# Patient Record
Sex: MALE | Race: WHITE | NOT HISPANIC OR LATINO | Employment: OTHER | URBAN - METROPOLITAN AREA
[De-identification: names, ages, dates, MRNs, and addresses within clinical notes are randomized per-mention and may not be internally consistent; named-entity substitution may affect disease eponyms.]

---

## 2017-01-30 ENCOUNTER — APPOINTMENT (OUTPATIENT)
Dept: SLEEP MEDICINE | Facility: MEDICAL CENTER | Age: 82
End: 2017-01-30
Payer: MEDICARE

## 2017-02-07 ENCOUNTER — HOSPITAL ENCOUNTER (OUTPATIENT)
Dept: RADIOLOGY | Facility: MEDICAL CENTER | Age: 82
End: 2017-02-07
Attending: PHYSICIAN ASSISTANT
Payer: MEDICARE

## 2017-02-07 DIAGNOSIS — Z01.818 PREOP EXAMINATION: ICD-10-CM

## 2017-02-07 PROCEDURE — 71020 DX-CHEST-2 VIEWS: CPT

## 2017-03-02 ENCOUNTER — TELEPHONE (OUTPATIENT)
Dept: PULMONOLOGY | Facility: HOSPICE | Age: 82
End: 2017-03-02

## 2017-03-02 DIAGNOSIS — G47.33 OSA (OBSTRUCTIVE SLEEP APNEA): ICD-10-CM

## 2017-03-02 NOTE — TELEPHONE ENCOUNTER
Patient is calling for clearance for TERESA only, CNOX was done through [facility], this is scanned into the patients chart.   I requested sleep compliance.

## 2017-03-02 NOTE — TELEPHONE ENCOUNTER
1. Caller Name: Gumaro Garsia                 Call Back Number: [phone] (home)       Patient approves a detailed voicemail message: yes    2.  What is the procedure: Hip Suregery    3.  When is the procedure scheduled: TBD    4.  Who is the Surgeon: Dr. Froylan Gunter    5. Has the patient completed any screening tests for the procedure:     6. Has [facility] received a written request from Surgeon: no    7. Patient scheduled for an appointment for this clearance?:  no    8. Last OV: 11/29/2016 - Tere[facility] Rehman    9. Next OV: 03/15/2017 - Tereza Rehman     10. Last CNOX: 01/01/2017 - [facility] Scanned into [facility]    11. Last Compliance: 06/27/16 - I Can request download from [facility]    Current Medications  Current Outpatient Prescriptions   Medication Sig Dispense Refill   • gabapentin (NEURONTIN) 300 MG Cap Take 300 mg by mouth 3 times a day.     • ipratropium (ATROVENT) 0.02 % Solution 0.2 mg by Nebulization route 4 times a day.     • famotidine (PEPCID) 40 MG Tab Take 40 mg by mouth every day.     • fluticasone (FLONASE) 50 MCG/ACT nasal spray Spray 1-2 Sprays in nose every day.     • tamsulosin (FLOMAX) 0.4 MG capsule Take 0.4 mg by mouth ONE-HALF HOUR AFTER BREAKFAST.     • Cholecalciferol (VITAMIN D3) 5000 UNITS Tab Take  by mouth every day.     • Artificial Tear Solution (BION TEARS OP) Take  by mouth every day.     • levothyroxine (SYNTHROID) 75 MCG TABS Take 100 mcg by mouth every day.     • Calcium Carbonate (CALCIUM 500 PO) Take  by mouth every day. Takes two tablets     • TESTOSTERONE CYPIONATE IM 0.05 mL by Intramuscular route every 7 days.     • atorvastatin (LIPITOR) 40 MG TABS Take 40 mg by mouth every evening.     • Ginkgo Biloba (GNP GINGKO BILOBA EXTRACT PO) Take  by mouth every day.     • aspirin 81 MG tablet Take 81 mg by mouth. Three times per week     • trospium (SANCTURA) 20 MG TABS Take 20 mg by mouth 2 Times a Day.     • Multiple Vitamin (MULTI-VITAMIN PO) Take 1 Tab by  mouth every evening.     • VITAMIN D PO Take 5,000 Int'l Units by mouth every evening.       No current facility-administered medications for this visit.       Please advise.

## 2017-03-02 NOTE — TELEPHONE ENCOUNTER
If he is requesting clearance for sleep apnea only, then please request download and CNOX from [facility] and I can write a clearance. However, if they want Pulmonary clearance then he will need testing.

## 2017-03-08 NOTE — TELEPHONE ENCOUNTER
Spoke to Radha at [facility] and the last download is from November. I will advise patient he needs to drop off the chip to machine.  Please sign order for complilance download.

## 2017-03-15 ENCOUNTER — SLEEP CENTER VISIT (OUTPATIENT)
Dept: SLEEP MEDICINE | Facility: MEDICAL CENTER | Age: 82
End: 2017-03-15
Payer: MEDICARE

## 2017-03-15 VITALS
TEMPERATURE: 98.4 F | WEIGHT: 195 LBS | RESPIRATION RATE: 16 BRPM | SYSTOLIC BLOOD PRESSURE: 116 MMHG | OXYGEN SATURATION: 91 % | HEART RATE: 63 BPM | HEIGHT: 70 IN | DIASTOLIC BLOOD PRESSURE: 72 MMHG | BODY MASS INDEX: 27.92 KG/M2

## 2017-03-15 DIAGNOSIS — R09.02 HYPOXEMIA: ICD-10-CM

## 2017-03-15 DIAGNOSIS — G47.33 OBSTRUCTIVE SLEEP APNEA: ICD-10-CM

## 2017-03-15 PROCEDURE — 99213 OFFICE O/P EST LOW 20 MIN: CPT | Performed by: NURSE PRACTITIONER

## 2017-03-15 NOTE — PROGRESS NOTES
Chief Complaint   Patient presents with   • Apnea     CPAP 16/11       HPI:  Gumaro Garsia is a 86 y.o. year old male here today for follow-up on his obstructive sleep apnea. His initial Polysomnogram indicated an AHI of 51 with severe desaturations. He had been on Auto CPAP 10-20 CM H20. CNOX indicated persistent 02 desaturations and 02 bleed in at 2 LPM was recommended. He underwent a dedicated in lab titration study on 4/26/2016 which indicates incomplete titration to CPAP. He was switched to Bipap. He did best on a Bilevel pressure of 18/13 CM with a resultant AHI of 3.5. He did require 02 bleed in at 2 LPM. His compliance card download indicated an AHI of 7.6 with an average use of 6-7 hours at night. Overnight oximetry 11/12/2016 on Bipap with 2 LPM 02 bleed in, indicated a mean 02 saturation of 89.4% with 168 minutes spent with saturations less than 88%. He felt his Bipap pressures were too high. He was waking bloated and was experiencing increased belching in the morning. His pressure was decreased to 16/11 CM and his 02 bleed in was increased to 3 LPM. Repeat oximetry 12/31/2016 indicates a mean 02 saturation of 89.4% with 153 minutes with saturations less than 88%. His repeat compliance card download indicates an AHI of 5.8 with an average use of 7 hours at night. He is tolerating the lower pressure better. He feels he sleeps well at night. He denies significant daytime fatigue. He denies any morning headaches. He has nasal pillows. He does not feel he is opening his mouth during sleep.   He is pending upcoming hip surgery.   He does have a history of Atrial fibrillation and is followed by Cardiology.      Past Medical History   Diagnosis Date   • Arthritis    • Sleep apnea      CPAP   • Unspecified disorder of thyroid      LOW THYROID   • Arrhythmia      ATRIAL FLUTTER   • Atrial flutter (CMS-HCC) 1/4/2012     S/p ablation    • Lipids abnormal 1/4/2012   • Rotator cuff arthropathy    • Cancer  (CMS-[facility]) 2007     PROSTATE  WITH RADIATION   • Urinary incontinence    • High cholesterol    • Cataract    • Pain      right shoulder   • Pain 11/11/15     left knee right hip   • Obstructive sleep apnea        Past Surgical History   Procedure Laterality Date   • Recovery  12/9/2010     Performed by SURGERY, CATH-RECOVERY at SURGERY SAME DAY [facility] ORS   • Appendectomy  1950   • Inguinal hernia repair  1965     LEFT   • Other  1946      tonsillectomy   • Lumbar decompression  1996     L2-4 FUSION   • Recovery  1/6/2011     Performed by SURGERY, CATH-RECOVERY at SURGERY SAME DAY [facility] ORS   • Other cardiac surgery       ablation   • Lumbar laminectomy diskectomy  4/14/2011     Performed by LUIS GREGORIO at SURGERY [facility]   • Lumbar decompression  4/14/2011     Performed by LUIS GREGORIO at SURGERY [facility]   • Tonsillectomy  1947   • Shoulder arthroscopy w/ rotator cuff repair  5/29/2013     Performed by Clovis Cabral M.D. at SURGERY [facility]   • Shoulder decompression arthroscopic  5/29/2013     Performed by Clovis Cabral M.D. at [facility]   • Knee arthroplasty total Left 11/24/2015     Procedure: KNEE ARTHROPLASTY TOTAL;  Surgeon: Clovis Cabral M.D.;  Location: SURGERY [facility];  Service:        Family History   Problem Relation Age of Onset   • Other Mother      Multiple myeloma   • Heart Attack Father 79   • Diabetes     • Cancer     • Heart Disease         Social History     Social History   • Marital Status:      Spouse Name: N/A   • Number of Children: N/A   • Years of Education: N/A     Occupational History   • Not on file.     Social History Main Topics   • Smoking status: Never Smoker    • Smokeless tobacco: Not on file   • Alcohol Use: No   • Drug Use: No   • Sexual Activity: Not on file     Other Topics Concern   • Not on file     Social History Narrative       ROS:  Constitutional: Denies fevers, chills, sweats, fatigue, weight loss  Eyes:  Denies vision loss, pain, drainage, double vision  Ears/Nose/Mouth/Throat: Denies rhinitis, nasal congestion, ear ache, sore throat, persistent hoarseness, decayed teeth/toothache. [place]   Cardiovascular: Denies chest pain, tightness, palpitations, swelling in feet/legs, fainting, difficulty breathing when laying down  Respiratory: Denies shortness of breath, cough, sputum, wheezing, painful breathing, coughing up blood  GI: Denies heartburn, difficulty swallowing, nausea, vomiting, abdominal pain, diarrhea, constipation  : Denies frequent urination, painful urination  Integumentary: Denies rashes, lumps or color changes  MSK: Positive hip pain   Neurological: Denies frequent headaches, dizziness, weakness  Sleep: See HPI       Current Outpatient Prescriptions on File Prior to Visit   Medication Sig Dispense Refill   • gabapentin (NEURONTIN) 300 MG Cap Take 300 mg by mouth 3 times a day.     • ipratropium (ATROVENT) 0.02 % Solution 0.2 mg by Nebulization route 4 times a day.     • famotidine (PEPCID) 40 MG Tab Take 40 mg by mouth every day.     • fluticasone (FLONASE) 50 MCG/ACT nasal spray Spray 1-2 Sprays in nose every day.     • tamsulosin (FLOMAX) 0.4 MG capsule Take 0.4 mg by mouth ONE-HALF HOUR AFTER BREAKFAST.     • Cholecalciferol (VITAMIN D3) 5000 UNITS Tab Take  by mouth every day.     • Artificial Tear Solution (BION TEARS OP) Take  by mouth every day.     • levothyroxine (SYNTHROID) 75 MCG TABS Take 100 mcg by mouth every day.     • Calcium Carbonate (CALCIUM 500 PO) Take  by mouth every day. Takes two tablets     • TESTOSTERONE CYPIONATE IM 0.05 mL by Intramuscular route every 7 days.     • atorvastatin (LIPITOR) 40 MG TABS Take 40 mg by mouth every evening.     • Ginkgo Biloba (GNP GINGKO BILOBA EXTRACT PO) Take  by mouth every day.     • aspirin 81 MG tablet Take 81 mg by mouth. Three times per week     • trospium (SANCTURA) 20 MG TABS Take 20 mg by mouth 2 Times a Day.     • Multiple Vitamin  "(MULTI-VITAMIN PO) Take 1 Tab by mouth every evening.     • VITAMIN D PO Take 5,000 Int'l Units by mouth every evening.       No current facility-administered medications on file prior to visit.     Review of patient's allergies indicates no known allergies.    Blood pressure 116/72, pulse 63, temperature 36.9 °C (98.4 °F), resp. rate 16, height 1.778 m (5' 10\"), weight 88.451 kg (195 lb), SpO2 91 %.  PE:   Appearance: Well developed, well nourished, no acute distress  Eyes: PERRL, EOM intact, sclera white, conjunctiva moist  Ears: Lumbee, bilateral hearing aids   Hearing: grossly intact  Nose: no lesions or deformities  Oropharynx: tongue normal, posterior pharynx without erythema or exudate  Mallampati Classification: class 4  Neck: supple, trachea midline, no masses   Respiratory effort: no intercostal retractions or use of accessory muscles  Lung auscultation: no rales, rhonchi or wheezes  Heart auscultation: no murmur rub or gallop  Extremities: no cyanosis or edema  Abdomen: soft ,non tender, no masses  Gait and Station: normal  Digits and nails: no clubbing, cyanosis, petechiae or nodes.  Cranial nerves: grossly intact  Skin: no rashes, lesions or ulcers noted  Orientation: Oriented to time, person and place  Mood and affect: mood and affect appropriate, normal interaction with examiner  Judgement: Intact          Assessment:  1. Obstructive sleep apnea     2. Hypoxemia           Plan:    1) Continue Bipap 16/11 CM H20. Increase 02 bleed in to 4 LPM.   2) Sleep hygiene discussed.  3) He is pending upcoming hip surgery.  4) 6 month follow up, sooner if needed.   "

## 2017-03-15 NOTE — MR AVS SNAPSHOT
"Gumaro Garsia   3/15/2017 1:00 PM   Sleep Center Visit   MRN: [id number]    Department:  [facility]   Dept Phone:  [phone]    Description:  Male : 1931   Provider:  DEMETRICE Amaral           Reason for Visit     Apnea CPAP       Allergies as of 3/15/2017     No Known Allergies      You were diagnosed with     Obstructive sleep apnea   [782052]       Hypoxemia   [799.02.ICD-9-CM]         Vital Signs     Blood Pressure Pulse Temperature Respirations Height Weight    116/72 mmHg 63 36.9 °C (98.4 °F) 16 1.778 m (5' 10\") 88.451 kg (195 lb)    Body Mass Index Oxygen Saturation Smoking Status             27.98 kg/m2 91% Never Smoker          Basic Information     Date Of Birth Sex Race Ethnicity Preferred Language    1931 Male White Non- English      Your appointments     Oct 13, 2017 10:00 AM   Follow UP with DEMETRICE Amaral   [facility] (--)    [address] [place] [id number]   [phone]              Problem List              ICD-10-CM Priority Class Noted - Resolved    Lipids abnormal E78.89   2012 - Present    Aortic valve sclerosis I35.8   4/15/2013 - Present    Hypothyroid E03.9   4/15/2013 - Present    Rotator cuff arthropathy M12.819   Unknown - Present    Rotator cuff tear    2013 - Present    Osteoarthritis of left knee M17.9   2015 - Present    Obstructive sleep apnea G47.33   Unknown - Present    Hypoxemia R09.02   2016 - Present      Health Maintenance        Date Due Completion Dates    IMM DTaP/Tdap/Td Vaccine (1 - Tdap) 1950 ---    COLONOSCOPY 1981 ---    IMM ZOSTER VACCINE 1991 ---    IMM PNEUMOCOCCAL 65+ (ADULT) LOW/MEDIUM RISK SERIES (1 of 2 - PCV13) 1996 ---    IMM INFLUENZA (1) 2016 ---            Current Immunizations     Pneumococcal Vaccine (UF)Historical Data 10/1/2010      Below and/or attached are the medications your provider expects you " to take. Review all of your home medications and newly ordered medications with your provider and/or pharmacist. Follow medication instructions as directed by your provider and/or pharmacist. Please keep your medication list with you and share with your provider. Update the information when medications are discontinued, doses are changed, or new medications (including over-the-counter products) are added; and carry medication information at all times in the event of emergency situations     Allergies:  No Known Allergies          Medications  Valid as of: March 15, 2017 -  1:30 PM    Generic Name Brand Name Tablet Size Instructions for use    Artificial Tear Solution   Take  by mouth every day.        Aspirin (Tab) aspirin 81 MG Take 81 mg by mouth. Three times per week        Atorvastatin Calcium (Tab) LIPITOR 40 MG Take 40 mg by mouth every evening.        Calcium-Magnesium-Vitamin D   Take  by mouth every day. Takes two tablets        Cholecalciferol   Take 5,000 Int'l Units by mouth every evening.        Cholecalciferol (Tab) Vitamin D3 5000 UNITS Take  by mouth every day.        Famotidine (Tab) PEPCID 40 MG Take 40 mg by mouth every day.        Fluticasone Propionate (Suspension) FLONASE 50 MCG/ACT Spray 1-2 Sprays in nose every day.        Gabapentin (Cap) NEURONTIN 300 MG Take 300 mg by mouth 3 times a day.        Ginkgo Biloba   Take  by mouth every day.        Ipratropium Bromide (Solution) ATROVENT 0.02 % 0.2 mg by Nebulization route 4 times a day.        Levothyroxine Sodium (Tab) SYNTHROID 75 MCG Take 100 mcg by mouth every day.        Multiple Vitamin   Take 1 Tab by mouth every evening.        Tamsulosin HCl (Cap) FLOMAX 0.4 MG Take 0.4 mg by mouth ONE-HALF HOUR AFTER BREAKFAST.        Testosterone Cypionate   0.05 mL by Intramuscular route every 7 days.        Trospium Chloride (Tab) SANCTURA 20 MG Take 20 mg by mouth 2 Times a Day.        .                 Medicines prescribed today were sent to:        [id number] - [place], [place] - [address]    [address] [place] [address]    Phone: [phone] Fax: [phone]    Open 24 Hours?: No      Medication refill instructions:       If your prescription bottle indicates you have medication refills left, it is not necessary to call your provider’s office. Please contact your pharmacy and they will refill your medication.    If your prescription bottle indicates you do not have any refills left, you may request refills at any time through one of the following ways: The online [employer] system (except Urgent Care), by calling your provider’s office, or by asking your pharmacy to contact your provider’s office with a refill request. Medication refills are processed only during regular business hours and may not be available until the next business day. Your provider may request additional information or to have a follow-up visit with you prior to refilling your medication.   *Please Note: Medication refills are assigned a new Rx number when refilled electronically. Your pharmacy may indicate that no refills were authorized even though a new prescription for the same medication is available at the pharmacy. Please request the medicine by name with the pharmacy before contacting your provider for a refill.        Instructions    Plan:    1) Continue Bipap 16/11 CM H20. Increase 02 bleed in to 4 LPM.   2) Sleep hygiene discussed.  3) He is pending upcoming hip surgery.  4) 6 month follow up, sooner if needed.           [employer] Access Code: Activation code not generated  Current [employer] Status: Active

## 2017-03-15 NOTE — PATIENT INSTRUCTIONS
Plan:    1) Continue Bipap 16/11 CM H20. Increase 02 bleed in to 4 LPM.   2) Sleep hygiene discussed.  3) He is pending upcoming hip surgery.  4) 6 month follow up, sooner if needed.

## 2017-03-23 DIAGNOSIS — Z01.810 PRE-OPERATIVE CARDIOVASCULAR EXAMINATION: ICD-10-CM

## 2017-03-23 DIAGNOSIS — Z01.812 PRE-PROCEDURAL LABORATORY EXAMINATION: ICD-10-CM

## 2017-03-23 LAB
ANION GAP SERPL CALC-SCNC: 8 MMOL/L (ref 0–11.9)
APPEARANCE UR: CLEAR
BASOPHILS # BLD AUTO: 1 % (ref 0–1.8)
BASOPHILS # BLD: 0.04 K/UL (ref 0–0.12)
BILIRUB UR QL STRIP.AUTO: NEGATIVE
BUN SERPL-MCNC: 22 MG/DL (ref 8–22)
CALCIUM SERPL-MCNC: 9.7 MG/DL (ref 8.5–10.5)
CHLORIDE SERPL-SCNC: 104 MMOL/L (ref 96–112)
CO2 SERPL-SCNC: 27 MMOL/L (ref 20–33)
COLOR UR: COLORLESS
CREAT SERPL-MCNC: 0.96 MG/DL (ref 0.5–1.4)
CULTURE IF INDICATED INDCX: NO UA CULTURE
EKG IMPRESSION: NORMAL
EOSINOPHIL # BLD AUTO: 0.04 K/UL (ref 0–0.51)
EOSINOPHIL NFR BLD: 1 % (ref 0–6.9)
ERYTHROCYTE [DISTWIDTH] IN BLOOD BY AUTOMATED COUNT: 48.6 FL (ref 35.9–50)
GFR SERPL CREATININE-BSD FRML MDRD: >60 ML/MIN/1.73 M 2
GLUCOSE SERPL-MCNC: 94 MG/DL (ref 65–99)
GLUCOSE UR STRIP.AUTO-MCNC: NEGATIVE MG/DL
HCT VFR BLD AUTO: 46.6 % (ref 42–52)
HGB BLD-MCNC: 15.5 G/DL (ref 14–18)
HIV 1+2 AB+HIV1 P24 AG SERPL QL IA: NON REACTIVE
IMM GRANULOCYTES # BLD AUTO: 0.01 K/UL (ref 0–0.11)
IMM GRANULOCYTES NFR BLD AUTO: 0.2 % (ref 0–0.9)
KETONES UR STRIP.AUTO-MCNC: NEGATIVE MG/DL
LEUKOCYTE ESTERASE UR QL STRIP.AUTO: NEGATIVE
LYMPHOCYTES # BLD AUTO: 0.8 K/UL (ref 1–4.8)
LYMPHOCYTES NFR BLD: 19.4 % (ref 22–41)
MCH RBC QN AUTO: 31.3 PG (ref 27–33)
MCHC RBC AUTO-ENTMCNC: 33.3 G/DL (ref 33.7–35.3)
MCV RBC AUTO: 94 FL (ref 81.4–97.8)
MICRO URNS: NORMAL
MONOCYTES # BLD AUTO: 0.6 K/UL (ref 0–0.85)
MONOCYTES NFR BLD AUTO: 14.5 % (ref 0–13.4)
NEUTROPHILS # BLD AUTO: 2.64 K/UL (ref 1.82–7.42)
NEUTROPHILS NFR BLD: 63.9 % (ref 44–72)
NITRITE UR QL STRIP.AUTO: NEGATIVE
NRBC # BLD AUTO: 0 K/UL
NRBC BLD AUTO-RTO: 0 /100 WBC
PH UR STRIP.AUTO: 7.5 [PH]
PLATELET # BLD AUTO: 149 K/UL (ref 164–446)
PMV BLD AUTO: 11.6 FL (ref 9–12.9)
POTASSIUM SERPL-SCNC: 4.1 MMOL/L (ref 3.6–5.5)
PROT UR QL STRIP: NEGATIVE MG/DL
RBC # BLD AUTO: 4.96 M/UL (ref 4.7–6.1)
RBC UR QL AUTO: NEGATIVE
SCCMEC + MECA PNL NOSE NAA+PROBE: NEGATIVE
SCCMEC + MECA PNL NOSE NAA+PROBE: NEGATIVE
SODIUM SERPL-SCNC: 139 MMOL/L (ref 135–145)
SP GR UR STRIP.AUTO: 1.01
WBC # BLD AUTO: 4.1 K/UL (ref 4.8–10.8)

## 2017-03-23 PROCEDURE — 81003 URINALYSIS AUTO W/O SCOPE: CPT

## 2017-03-23 PROCEDURE — 87640 STAPH A DNA AMP PROBE: CPT

## 2017-03-23 PROCEDURE — 87641 MR-STAPH DNA AMP PROBE: CPT

## 2017-03-23 PROCEDURE — 36415 COLL VENOUS BLD VENIPUNCTURE: CPT

## 2017-03-23 PROCEDURE — 80048 BASIC METABOLIC PNL TOTAL CA: CPT

## 2017-03-23 PROCEDURE — 85025 COMPLETE CBC W/AUTO DIFF WBC: CPT

## 2017-03-23 PROCEDURE — 87389 HIV-1 AG W/HIV-1&-2 AB AG IA: CPT

## 2017-03-23 NOTE — DISCHARGE PLANNING
DISCHARGE PLANNING NOTE - TOTAL JOINT    Procedure: Procedure(s):  HIP ARTHROPLASTY ANTERIOR TOTAL  Procedure Date: 4/6/2017  Insurance:  PEBP  Equipment currently available at home? Crutches, cane, FWW, raised toilet seat  Steps into the home? 2  Steps within the home? 2 story home, 13 stairs with railing  Toilet height? Standard with raised toilet seat  Type of shower? Walk-in has chair  Who will be with you during your recovery? other: wife  Is Outpatient Physical Therapy set up after surgery? no  Did you take the Total Joint Class and where? Yes at [facility]    Plan: Patient has the equipment he needs. He is concerned about his balance as he has fallen 2 x in the last couple months.

## 2017-04-05 RX ORDER — CEFAZOLIN SODIUM 2 G/100ML
2 INJECTION, SOLUTION INTRAVENOUS ONCE
Status: ACTIVE | OUTPATIENT
Start: 2017-04-06 | End: 2017-04-07

## 2017-04-06 ENCOUNTER — APPOINTMENT (OUTPATIENT)
Dept: RADIOLOGY | Facility: MEDICAL CENTER | Age: 82
DRG: 470 | End: 2017-04-06
Attending: ORTHOPAEDIC SURGERY
Payer: MEDICARE

## 2017-04-06 ENCOUNTER — HOSPITAL ENCOUNTER (INPATIENT)
Facility: MEDICAL CENTER | Age: 82
LOS: 1 days | DRG: 470 | End: 2017-04-07
Attending: ORTHOPAEDIC SURGERY | Admitting: ORTHOPAEDIC SURGERY
Payer: MEDICARE

## 2017-04-06 PROBLEM — M16.12 UNILATERAL PRIMARY OSTEOARTHRITIS, LEFT HIP: Status: ACTIVE | Noted: 2017-04-06

## 2017-04-06 PROCEDURE — 502240 HCHG MISC OR SUPPLY RC 0272: Performed by: ORTHOPAEDIC SURGERY

## 2017-04-06 PROCEDURE — 160031 HCHG SURGERY MINUTES - 1ST 30 MINS LEVEL 5: Performed by: ORTHOPAEDIC SURGERY

## 2017-04-06 PROCEDURE — 500811 HCHG LENS/HOOD FOR SPACESUIT: Performed by: ORTHOPAEDIC SURGERY

## 2017-04-06 PROCEDURE — 770001 HCHG ROOM/CARE - MED/SURG/GYN PRIV*

## 2017-04-06 PROCEDURE — 700101 HCHG RX REV CODE 250

## 2017-04-06 PROCEDURE — 700102 HCHG RX REV CODE 250 W/ 637 OVERRIDE(OP): Performed by: ORTHOPAEDIC SURGERY

## 2017-04-06 PROCEDURE — 700102 HCHG RX REV CODE 250 W/ 637 OVERRIDE(OP)

## 2017-04-06 PROCEDURE — A6402 STERILE GAUZE <= 16 SQ IN: HCPCS | Performed by: ORTHOPAEDIC SURGERY

## 2017-04-06 PROCEDURE — 160009 HCHG ANES TIME/MIN: Performed by: ORTHOPAEDIC SURGERY

## 2017-04-06 PROCEDURE — 700111 HCHG RX REV CODE 636 W/ 250 OVERRIDE (IP)

## 2017-04-06 PROCEDURE — 501838 HCHG SUTURE GENERAL: Performed by: ORTHOPAEDIC SURGERY

## 2017-04-06 PROCEDURE — A9270 NON-COVERED ITEM OR SERVICE: HCPCS

## 2017-04-06 PROCEDURE — 700112 HCHG RX REV CODE 229: Performed by: ORTHOPAEDIC SURGERY

## 2017-04-06 PROCEDURE — A9270 NON-COVERED ITEM OR SERVICE: HCPCS | Performed by: ORTHOPAEDIC SURGERY

## 2017-04-06 PROCEDURE — 0SRB04A REPLACEMENT OF LEFT HIP JOINT WITH CERAMIC ON POLYETHYLENE SYNTHETIC SUBSTITUTE, UNCEMENTED, OPEN APPROACH: ICD-10-PCS | Performed by: ORTHOPAEDIC SURGERY

## 2017-04-06 PROCEDURE — 160035 HCHG PACU - 1ST 60 MINS PHASE I: Performed by: ORTHOPAEDIC SURGERY

## 2017-04-06 PROCEDURE — 502578 HCHG PACK, TOTAL HIP: Performed by: ORTHOPAEDIC SURGERY

## 2017-04-06 PROCEDURE — 500088 HCHG BLADE, SAGITTAL: Performed by: ORTHOPAEDIC SURGERY

## 2017-04-06 PROCEDURE — A4606 OXYGEN PROBE USED W OXIMETER: HCPCS | Performed by: ORTHOPAEDIC SURGERY

## 2017-04-06 PROCEDURE — 770006 HCHG ROOM/CARE - MED/SURG/GYN SEMI*

## 2017-04-06 PROCEDURE — 160036 HCHG PACU - EA ADDL 30 MINS PHASE I: Performed by: ORTHOPAEDIC SURGERY

## 2017-04-06 PROCEDURE — 501486 HCHG STRYKER IRRIG SET HC W/TUBING: Performed by: ORTHOPAEDIC SURGERY

## 2017-04-06 PROCEDURE — 160048 HCHG OR STATISTICAL LEVEL 1-5: Performed by: ORTHOPAEDIC SURGERY

## 2017-04-06 PROCEDURE — 110371 HCHG SHELL REV 272: Performed by: ORTHOPAEDIC SURGERY

## 2017-04-06 PROCEDURE — 700111 HCHG RX REV CODE 636 W/ 250 OVERRIDE (IP): Performed by: ORTHOPAEDIC SURGERY

## 2017-04-06 PROCEDURE — 160042 HCHG SURGERY MINUTES - EA ADDL 1 MIN LEVEL 5: Performed by: ORTHOPAEDIC SURGERY

## 2017-04-06 PROCEDURE — 501487 HCHG STRYKER TIP: Performed by: ORTHOPAEDIC SURGERY

## 2017-04-06 PROCEDURE — 502000 HCHG MISC OR IMPLANTS RC 0278: Performed by: ORTHOPAEDIC SURGERY

## 2017-04-06 PROCEDURE — 160002 HCHG RECOVERY MINUTES (STAT): Performed by: ORTHOPAEDIC SURGERY

## 2017-04-06 PROCEDURE — 110382 HCHG SHELL REV 271: Performed by: ORTHOPAEDIC SURGERY

## 2017-04-06 PROCEDURE — 700105 HCHG RX REV CODE 258: Performed by: ORTHOPAEDIC SURGERY

## 2017-04-06 PROCEDURE — 500002 HCHG ADHESIVE, DERMABOND: Performed by: ORTHOPAEDIC SURGERY

## 2017-04-06 PROCEDURE — 700101 HCHG RX REV CODE 250: Performed by: ORTHOPAEDIC SURGERY

## 2017-04-06 DEVICE — IMPLANT R3 0 DEG XLPE ACET LNR 36MM X MM56: Type: IMPLANTABLE DEVICE | Status: FUNCTIONAL

## 2017-04-06 DEVICE — IMPLANT REF THREADED HOLE COVER (1EA): Type: IMPLANTABLE DEVICE | Status: FUNCTIONAL

## 2017-04-06 DEVICE — IMPLANTABLE DEVICE: Type: IMPLANTABLE DEVICE | Status: FUNCTIONAL

## 2017-04-06 DEVICE — IMPLANT REF SPHER HEAD SCREW 20MM (1EA): Type: IMPLANTABLE DEVICE | Status: FUNCTIONAL

## 2017-04-06 DEVICE — IMPLANT R3 3 HOLE ACET SHELL 56MM (1EA): Type: IMPLANTABLE DEVICE | Status: FUNCTIONAL

## 2017-04-06 DEVICE — IMPLANT OXINIUM FEM HD 12/14 36 MM M/+4 (1EA): Type: IMPLANTABLE DEVICE | Status: FUNCTIONAL

## 2017-04-06 DEVICE — IMPLANT REF SPHER HEAD SCREW 30MM (1EA): Type: IMPLANTABLE DEVICE | Status: FUNCTIONAL

## 2017-04-06 RX ORDER — ACETAMINOPHEN 650 MG
TABLET, EXTENDED RELEASE ORAL
Status: DISCONTINUED | OUTPATIENT
Start: 2017-04-06 | End: 2017-04-06 | Stop reason: HOSPADM

## 2017-04-06 RX ORDER — AMOXICILLIN 250 MG
1 CAPSULE ORAL NIGHTLY
Status: DISCONTINUED | OUTPATIENT
Start: 2017-04-06 | End: 2017-04-07 | Stop reason: HOSPADM

## 2017-04-06 RX ORDER — AMOXICILLIN 250 MG
1 CAPSULE ORAL
Status: DISCONTINUED | OUTPATIENT
Start: 2017-04-06 | End: 2017-04-07 | Stop reason: HOSPADM

## 2017-04-06 RX ORDER — IPRATROPIUM BROMIDE 42 UG/1
2 SPRAY, METERED NASAL 2 TIMES DAILY PRN
Status: ON HOLD | COMMUNITY
End: 2017-04-06

## 2017-04-06 RX ORDER — OXYCODONE HCL 5 MG/5 ML
SOLUTION, ORAL ORAL
Status: COMPLETED
Start: 2017-04-06 | End: 2017-04-06

## 2017-04-06 RX ORDER — LEVOTHYROXINE SODIUM 0.03 MG/1
100 TABLET ORAL
Status: DISCONTINUED | OUTPATIENT
Start: 2017-04-06 | End: 2017-04-07 | Stop reason: HOSPADM

## 2017-04-06 RX ORDER — DOCUSATE SODIUM 100 MG/1
100 CAPSULE, LIQUID FILLED ORAL 2 TIMES DAILY
Status: DISCONTINUED | OUTPATIENT
Start: 2017-04-06 | End: 2017-04-07 | Stop reason: HOSPADM

## 2017-04-06 RX ORDER — CEFAZOLIN SODIUM 2 G/100ML
2 INJECTION, SOLUTION INTRAVENOUS EVERY 8 HOURS
Status: COMPLETED | OUTPATIENT
Start: 2017-04-06 | End: 2017-04-07

## 2017-04-06 RX ORDER — ATORVASTATIN CALCIUM 40 MG/1
40 TABLET, FILM COATED ORAL NIGHTLY
Status: DISCONTINUED | OUTPATIENT
Start: 2017-04-06 | End: 2017-04-07 | Stop reason: HOSPADM

## 2017-04-06 RX ORDER — TAMSULOSIN HYDROCHLORIDE 0.4 MG/1
0.4 CAPSULE ORAL
Status: DISCONTINUED | OUTPATIENT
Start: 2017-04-06 | End: 2017-04-07 | Stop reason: HOSPADM

## 2017-04-06 RX ORDER — DEXTROSE MONOHYDRATE, SODIUM CHLORIDE, AND POTASSIUM CHLORIDE 50; 1.49; 4.5 G/1000ML; G/1000ML; G/1000ML
INJECTION, SOLUTION INTRAVENOUS CONTINUOUS
Status: DISCONTINUED | OUTPATIENT
Start: 2017-04-06 | End: 2017-04-07 | Stop reason: HOSPADM

## 2017-04-06 RX ORDER — DIPHENHYDRAMINE HYDROCHLORIDE 50 MG/ML
25 INJECTION INTRAMUSCULAR; INTRAVENOUS EVERY 6 HOURS PRN
Status: DISCONTINUED | OUTPATIENT
Start: 2017-04-06 | End: 2017-04-07 | Stop reason: HOSPADM

## 2017-04-06 RX ORDER — MAGNESIUM HYDROXIDE 1200 MG/15ML
LIQUID ORAL
Status: DISCONTINUED | OUTPATIENT
Start: 2017-04-06 | End: 2017-04-06 | Stop reason: HOSPADM

## 2017-04-06 RX ORDER — LEVOTHYROXINE SODIUM 0.1 MG/1
100 TABLET ORAL
COMMUNITY

## 2017-04-06 RX ORDER — TESTOSTERONE CYPIONATE 200 MG/ML
100 INJECTION, SOLUTION INTRAMUSCULAR
COMMUNITY

## 2017-04-06 RX ORDER — HALOPERIDOL 5 MG/ML
1 INJECTION INTRAMUSCULAR EVERY 6 HOURS PRN
Status: DISCONTINUED | OUTPATIENT
Start: 2017-04-06 | End: 2017-04-07 | Stop reason: HOSPADM

## 2017-04-06 RX ORDER — ACETAMINOPHEN 500 MG
500-1000 TABLET ORAL
COMMUNITY

## 2017-04-06 RX ORDER — SODIUM CHLORIDE, SODIUM LACTATE, POTASSIUM CHLORIDE, CALCIUM CHLORIDE 600; 310; 30; 20 MG/100ML; MG/100ML; MG/100ML; MG/100ML
1000 INJECTION, SOLUTION INTRAVENOUS
Status: COMPLETED | OUTPATIENT
Start: 2017-04-06 | End: 2017-04-06

## 2017-04-06 RX ORDER — DEXAMETHASONE SODIUM PHOSPHATE 4 MG/ML
4 INJECTION, SOLUTION INTRA-ARTICULAR; INTRALESIONAL; INTRAMUSCULAR; INTRAVENOUS; SOFT TISSUE
Status: DISCONTINUED | OUTPATIENT
Start: 2017-04-06 | End: 2017-04-07 | Stop reason: HOSPADM

## 2017-04-06 RX ORDER — ONDANSETRON 2 MG/ML
4 INJECTION INTRAMUSCULAR; INTRAVENOUS EVERY 4 HOURS PRN
Status: DISCONTINUED | OUTPATIENT
Start: 2017-04-06 | End: 2017-04-07 | Stop reason: HOSPADM

## 2017-04-06 RX ORDER — TRAMADOL HYDROCHLORIDE 50 MG/1
50-100 TABLET ORAL EVERY 4 HOURS PRN
Status: DISCONTINUED | OUTPATIENT
Start: 2017-04-06 | End: 2017-04-06

## 2017-04-06 RX ORDER — DIPHENHYDRAMINE HCL 25 MG
25 TABLET ORAL NIGHTLY PRN
Status: DISCONTINUED | OUTPATIENT
Start: 2017-04-07 | End: 2017-04-07 | Stop reason: HOSPADM

## 2017-04-06 RX ORDER — ENEMA 19; 7 G/133ML; G/133ML
1 ENEMA RECTAL
Status: DISCONTINUED | OUTPATIENT
Start: 2017-04-06 | End: 2017-04-07 | Stop reason: HOSPADM

## 2017-04-06 RX ORDER — OXYCODONE HYDROCHLORIDE 5 MG/1
5 TABLET ORAL
Status: DISCONTINUED | OUTPATIENT
Start: 2017-04-06 | End: 2017-04-07 | Stop reason: HOSPADM

## 2017-04-06 RX ORDER — BISACODYL 10 MG
10 SUPPOSITORY, RECTAL RECTAL
Status: DISCONTINUED | OUTPATIENT
Start: 2017-04-06 | End: 2017-04-07 | Stop reason: HOSPADM

## 2017-04-06 RX ORDER — OXYBUTYNIN CHLORIDE 5 MG/1
5 TABLET ORAL 2 TIMES DAILY
Status: DISCONTINUED | OUTPATIENT
Start: 2017-04-06 | End: 2017-04-07 | Stop reason: HOSPADM

## 2017-04-06 RX ORDER — TRAMADOL HYDROCHLORIDE 50 MG/1
50 TABLET ORAL EVERY 6 HOURS PRN
Status: DISCONTINUED | OUTPATIENT
Start: 2017-04-06 | End: 2017-04-07 | Stop reason: HOSPADM

## 2017-04-06 RX ORDER — SCOLOPAMINE TRANSDERMAL SYSTEM 1 MG/1
1 PATCH, EXTENDED RELEASE TRANSDERMAL
Status: DISCONTINUED | OUTPATIENT
Start: 2017-04-06 | End: 2017-04-07 | Stop reason: HOSPADM

## 2017-04-06 RX ORDER — CELECOXIB 200 MG/1
200 CAPSULE ORAL
COMMUNITY
End: 2019-10-29

## 2017-04-06 RX ORDER — DIPHENHYDRAMINE HCL 25 MG
25 TABLET ORAL EVERY 6 HOURS PRN
Status: DISCONTINUED | OUTPATIENT
Start: 2017-04-06 | End: 2017-04-07 | Stop reason: HOSPADM

## 2017-04-06 RX ORDER — ACETAMINOPHEN 500 MG
1000 TABLET ORAL EVERY 6 HOURS
Status: DISCONTINUED | OUTPATIENT
Start: 2017-04-06 | End: 2017-04-07 | Stop reason: HOSPADM

## 2017-04-06 RX ORDER — TROSPIUM CHLORIDE 20 MG/1
20 TABLET, FILM COATED ORAL 2 TIMES DAILY
Status: DISCONTINUED | OUTPATIENT
Start: 2017-04-06 | End: 2017-04-06

## 2017-04-06 RX ORDER — TRAMADOL HYDROCHLORIDE 50 MG/1
100 TABLET ORAL EVERY 6 HOURS PRN
Status: DISCONTINUED | OUTPATIENT
Start: 2017-04-06 | End: 2017-04-07 | Stop reason: HOSPADM

## 2017-04-06 RX ORDER — BUPIVACAINE HYDROCHLORIDE AND EPINEPHRINE 2.5; 5 MG/ML; UG/ML
INJECTION, SOLUTION INFILTRATION; PERINEURAL
Status: DISCONTINUED | OUTPATIENT
Start: 2017-04-06 | End: 2017-04-06 | Stop reason: HOSPADM

## 2017-04-06 RX ORDER — GABAPENTIN 600 MG/1
600 TABLET ORAL
COMMUNITY
End: 2017-11-07

## 2017-04-06 RX ORDER — DEXAMETHASONE SODIUM PHOSPHATE 4 MG/ML
10 INJECTION, SOLUTION INTRA-ARTICULAR; INTRALESIONAL; INTRAMUSCULAR; INTRAVENOUS; SOFT TISSUE ONCE
Status: COMPLETED | OUTPATIENT
Start: 2017-04-07 | End: 2017-04-07

## 2017-04-06 RX ORDER — POLYETHYLENE GLYCOL 3350 17 G/17G
1 POWDER, FOR SOLUTION ORAL 2 TIMES DAILY PRN
Status: DISCONTINUED | OUTPATIENT
Start: 2017-04-06 | End: 2017-04-07 | Stop reason: HOSPADM

## 2017-04-06 RX ORDER — OXYCODONE HYDROCHLORIDE 5 MG/1
2.5 TABLET ORAL
Status: DISCONTINUED | OUTPATIENT
Start: 2017-04-06 | End: 2017-04-07 | Stop reason: HOSPADM

## 2017-04-06 RX ORDER — DEXTROSE MONOHYDRATE, SODIUM CHLORIDE, AND POTASSIUM CHLORIDE 50; 1.49; 4.5 G/1000ML; G/1000ML; G/1000ML
INJECTION, SOLUTION INTRAVENOUS
Status: COMPLETED
Start: 2017-04-06 | End: 2017-04-06

## 2017-04-06 RX ORDER — FAMOTIDINE 20 MG/1
40 TABLET, FILM COATED ORAL EVERY EVENING
Status: DISCONTINUED | OUTPATIENT
Start: 2017-04-06 | End: 2017-04-07 | Stop reason: HOSPADM

## 2017-04-06 RX ORDER — IPRATROPIUM BROMIDE 21 UG/1
2 SPRAY, METERED NASAL 2 TIMES DAILY
Status: DISCONTINUED | OUTPATIENT
Start: 2017-04-06 | End: 2017-04-07 | Stop reason: HOSPADM

## 2017-04-06 RX ADMIN — VITAMIN D, TAB 1000IU (100/BT) 5000 UNITS: 25 TAB at 12:36

## 2017-04-06 RX ADMIN — TRANEXAMIC ACID 1000 MG: 100 INJECTION, SOLUTION INTRAVENOUS at 12:27

## 2017-04-06 RX ADMIN — ACETAMINOPHEN 1000 MG: 500 TABLET, FILM COATED ORAL at 22:45

## 2017-04-06 RX ADMIN — OXYBUTYNIN CHLORIDE 5 MG: 5 TABLET ORAL at 12:36

## 2017-04-06 RX ADMIN — DOCUSATE SODIUM 100 MG: 100 CAPSULE ORAL at 12:36

## 2017-04-06 RX ADMIN — ACETAMINOPHEN 1000 MG: 500 TABLET, FILM COATED ORAL at 12:36

## 2017-04-06 RX ADMIN — OXYBUTYNIN CHLORIDE 5 MG: 5 TABLET ORAL at 20:00

## 2017-04-06 RX ADMIN — FENTANYL CITRATE 50 MCG: 50 INJECTION, SOLUTION INTRAMUSCULAR; INTRAVENOUS at 10:41

## 2017-04-06 RX ADMIN — ACETAMINOPHEN 1000 MG: 500 TABLET, FILM COATED ORAL at 17:57

## 2017-04-06 RX ADMIN — TAMSULOSIN HYDROCHLORIDE 0.4 MG: 0.4 CAPSULE ORAL at 12:36

## 2017-04-06 RX ADMIN — FENTANYL CITRATE 50 MCG: 50 INJECTION, SOLUTION INTRAMUSCULAR; INTRAVENOUS at 10:53

## 2017-04-06 RX ADMIN — DOCUSATE SODIUM 100 MG: 100 CAPSULE ORAL at 20:00

## 2017-04-06 RX ADMIN — FENTANYL CITRATE 50 MCG: 50 INJECTION, SOLUTION INTRAMUSCULAR; INTRAVENOUS at 11:01

## 2017-04-06 RX ADMIN — OXYCODONE HYDROCHLORIDE 5 MG: 5 SOLUTION ORAL at 11:00

## 2017-04-06 RX ADMIN — SODIUM CHLORIDE, SODIUM LACTATE, POTASSIUM CHLORIDE, CALCIUM CHLORIDE 1000 ML: 600; 310; 30; 20 INJECTION, SOLUTION INTRAVENOUS at 08:15

## 2017-04-06 RX ADMIN — POTASSIUM CHLORIDE, DEXTROSE MONOHYDRATE AND SODIUM CHLORIDE 1000 ML: 150; 5; 450 INJECTION, SOLUTION INTRAVENOUS at 11:45

## 2017-04-06 RX ADMIN — POTASSIUM CHLORIDE, DEXTROSE MONOHYDRATE AND SODIUM CHLORIDE 1 ML: 150; 5; 450 INJECTION, SOLUTION INTRAVENOUS at 22:44

## 2017-04-06 RX ADMIN — ATORVASTATIN CALCIUM 40 MG: 40 TABLET, FILM COATED ORAL at 20:00

## 2017-04-06 RX ADMIN — CEFAZOLIN SODIUM 2 G: 2 INJECTION, SOLUTION INTRAVENOUS at 16:31

## 2017-04-06 RX ADMIN — STANDARDIZED SENNA CONCENTRATE AND DOCUSATE SODIUM 1 TABLET: 8.6; 5 TABLET, FILM COATED ORAL at 20:00

## 2017-04-06 ASSESSMENT — PAIN SCALES - GENERAL
PAINLEVEL_OUTOF10: 0
PAINLEVEL_OUTOF10: 0
PAINLEVEL_OUTOF10: 2
PAINLEVEL_OUTOF10: 5

## 2017-04-06 ASSESSMENT — LIFESTYLE VARIABLES
EVER_SMOKED: NEVER
ALCOHOL_USE: NO

## 2017-04-06 ASSESSMENT — COPD QUESTIONNAIRES
COPD SCREENING SCORE: 2
DO YOU EVER COUGH UP ANY MUCUS OR PHLEGM?: NO/ONLY WITH OCCASIONAL COLDS OR INFECTIONS
DURING THE PAST 4 WEEKS HOW MUCH DID YOU FEEL SHORT OF BREATH: NONE/LITTLE OF THE TIME
HAVE YOU SMOKED AT LEAST 100 CIGARETTES IN YOUR ENTIRE LIFE: NO/DON'T KNOW

## 2017-04-06 NOTE — OP REPORT
DIAGNOSIS: Osteoarthritis, left hip.    PROCEDURE: left Total hip arthroplasty.    ANESTHESIA: General.    COMPLICATIONS: None.    SURGEON: Froylan Gunter MD.    ASSISTANT: Quintin Saez    INDICATIONS: This is a patient with severe osteoarthritis causing pain,   having failed conservative treatments.    DESCRIPTION OF PROCEDURE: Patient was identified in the preop area, site was   marked, taken back to the operating room and underwent general anesthesia.   left lower extremity was prepped and draped in sterile manner. Preoperative   timeout was held and antibiotics were given. Incision was made coming off the  ASIS. Soft tissue dissected down to fascia. Fascia was split in line with   the tensor. Tensor was retracted laterally. Deep fascia was incised and   vessels were ligated. A capsulotomy was performed and then an osteotomy of   the femoral neck. The acetabulum was then reamed up to a 56 and a 56 cluster   hole cup by Smith and Nephew was placed. A liner was placed for a 36 head.   Osteophytes were debrided and then the femur was externally rotated and   extended. This was then broached up to a size 5, and a 5 lateral offset polar stem  by Smith and Nephew was placed. Final trialing showed equal leg lengths with  a +4 head, the 36 +4 Oxinium head by Smith and Nephew was placed. Wound was   soaked with dilute Betadine solution and was injected with Marcaine. Vicryl   was used for the fascia, Monocryl soft tissue skin and Dermabond for the final  skin layer. Patient was woken up, taken back to PACU, will be weightbear as   tolerated.

## 2017-04-06 NOTE — PROGRESS NOTES
The Medication Reconciliation process has been PARTIALLY completed by interviewing the patient, I will call his pharmacy and obtain his atrovent strength.     Allergies have been reviewed  Antibiotic use in 30 days - None  [facility]

## 2017-04-06 NOTE — IP AVS SNAPSHOT
" Home Care Instructions                                                                                                                  Name:Gumaro Garsia  Medical Record Number:[id number]  CSN: [id number]    YOB: 1931   Age: 86 y.o.  Sex: male  HT:1.778 m (5' 10\") WT: 88.9 kg (195 lb 15.8 oz)          Admit Date: 4/6/2017     Discharge Date:   Today's Date: 4/7/2017  Attending Doctor:  Froylan Gunter M.D.                  Allergies:  Review of patient's allergies indicates no known allergies.            Discharge Instructions       Discharge Instructions  *Follow up with Dr. Gunter at scheduled appointment  *Weight bearing as tolerated                     *Activity as tolerated  *Use assistive device for all activity  *Continue exercises provided by physical therapy  *Elevate leg as needed  *Ice as needed (20 minutes every 1-2 hours)  *Keep dressing in place until 04/11/2017 postoperative day #5   *Starting Tuesday remove dressing and shower. Do not soak or scrub incision, after shower pat dry and leave open to air.  *No soaking of the incision; no baths, hot tubs, or swimming until cleared by doctor  * Xarelto 10 mg once a day for blood clot prevention        *Take medications as prescribed by doctor  *Call doctor’s office with any questions or concerns     Discharged to home by car with relative. Discharged via wheelchair, hospital escort: Yes.  Special equipment needed: Walker    Be sure to schedule a follow-up appointment with your primary care doctor or any specialists as instructed.     Discharge Plan:   Diet Plan: Discussed  Activity Level: Discussed  Confirmed Follow up Appointment: Patient to Call and Schedule Appointment  Confirmed Symptoms Management: Discussed  Medication Reconciliation Updated: Yes  Influenza Vaccine Indication: Not indicated: Previously immunized this influenza season and > 8 years of age    I understand that a diet low in cholesterol, fat, and sodium is recommended " for good health. Unless I have been given specific instructions below for another diet, I accept this instruction as my diet prescription.   Other diet: Diet as tolerated    Special Instructions: Discharge instructions for the Orthopedic Patient    Follow up with Primary Care Physician within 2 weeks of discharge to home, regarding:  Review of medications and diagnostic testing.  Surveillance for medical complications.  Workup and treatment of osteoporosis, if appropriate.     -Is this a Joint Replacement patient? Yes   Total Joint Hip Replacement Discharge Instructions    Pain  - The goal is to slowly wean off the prescription pain medicine.  - Ice can be used for pain control.  20 minutes at a time is recommended, and never directly against your skin or incision.  - Most patients are off the pain pills by 3 weeks; others may require a low level of pain medications for many months. If your pain continues to be severe, follow up with your physician.  Infection  Deep hip joint infections that require removal of the prostheses occur in less than 0.1% of patients. Lesser infections in the skin (cellulites) are more common and much more easily treated.  - Keep the incision as clean and dry as possible.  - Always wash your hands before touching your incision.  - Skin infections tend to develop around 7-10 days after surgery, most can be treated with oral antibiotics.  - Dental Care should be delayed for 3 months after surgery, your surgeon recommends taking a dose of antibiotics 1 hour prior to any dental procedure.  After 2 years, most surgeons recommend antibiotics only before an extensive procedure.  Ask your surgeon what he recommends.  - Signs and symptoms of infection can include:  low grade fever, redness, pain, swelling and drainage from your incision.  Notify your surgeon immediately if you develop any of these symptoms.  Post op Disturbances  - Bowel habits - constipation is extremely common and is caused by a  combination of anesthesia, lack of mobility and pain medicine.  Use stool softeners or laxatives if necessary. It is important not to ignore this problem, as bowel obstructions can be a serious complication after joint replacement surgery.  - Mood/Energy Level - Many patients experience a lack of energy and endurance for up to 2-3 months after surgery.  Some may also feel down and can even become depressed.  This is likely due to the postoperative anemia, change in activity level, lack of sleep, pain medicine and just the emotional reaction to the surgery itself that is a big disruption in a person’s life.  This usually passes.  If symptoms persist, follow up with your primary physician.  - Returning to work - Your surgeon will give you more specific instructions.  Generally, if you work a sedentary job requiring little standing or walking, most patients may return within 2-6 weeks.  Manual labor jobs involving walking, lifting and standing may take 3-4 months.  Your surgeon’s office can provide a release to part-time or light duty work early on in your recovery and progress you to full duty as able.  - Driving - You can begin driving an automatic shift car in 4 to 8 weeks, provided you are no longer taking narcotic pain medication. If you have a stick-shift car and your right hip was replaced, do not begin driving until your doctor says you can.   - Avoiding falls -  throw rugs and tack down loose carpeting.  Be aware of floor hazards such as pets, small objects or uneven surfaces.   -  Airport Metal Detectors - The sensitivity of metal detectors varies and it is likely that your prosthesis will cause an alarm. Inform the  that you have an artificial joint.  Diet  - Resume your normal diet as tolerated.  - It is important to achieve a healthy nutritional status by eating a well balanced diet on a regular basis.  - Your physician may recommend that you take iron and vitamin supplements.    - Continue to drink plenty of fluids.  Shower/Bathing  - You may shower as soon as you get home from the hospital unless otherwise instructed.  - Keep your incision out of water.  To keep the incision dry when showering, cover it with a plastic bag or plastic wrap.  - Pat incision dry if it gets wet.  Don’t rub.  - Do not submerge in a bath until staples are out and the incision is completely healed. (Approximately 6-8 weeks after surgery).  Dressing Change:  Procedure (if recommended by your physician)  - Wash hands.  - Open all dressing change materials.  - Remove old dressing and discard.  - Inspect incision for redness, increase in clear drainage, yellow/green drainage, odor and surrounding skin hot to touch.  -  ABD (large gauze) pad by one corner and lay over the incision.  Be careful not to touch the inside of the dressing that will lay over the incision.  - Secure in place as instructed (Ace wrap or tape).    Swelling/Bruising  - Swelling is normal after hip replacement and can involve the thigh, knee, calf and foot.  - Swelling can last from 3-6 months.  - Elevate your leg higher than your heart while reclining.  The first week you are home you should elevate your leg an equal amount of time, as you are active.    - Anti-inflammatory pills can be taken once you have stopped the blood thinners.  - The swelling is usually worse after you go home since you are upright for longer periods of time.  - Bruising is common and can involve the entire leg including the thigh, calf and even foot.  Bruising often does not appear until after you arrive home and it can be quite dramatic- purple, black, green.  The bruising you can see is not usually concerning and will subside without any treatment.      Blood Clot Prevention  Blood clots in the legs and the less common, but frightening, clots that travel to the lungs are a real focus of our preventative. Most patients are at standard risk for them, but those  patients who are at higher risk include people who have had previous clots, a family history of clotting, smoking, diabetes, obesity, advanced age, use of estrogen and a sedentary lifestyle.    - Signs of blood clots in legs - Swelling in thigh, calf or ankle that does not go down with elevation.  Pain, heat and tenderness in calf, back of calf or groin area.  NOTE: blood clots can occur in either leg.  - You have been receiving anticoagulant therapy (blood thinners) in the hospital and you may be instructed to continue at home depending on your risk factors.  - Your risk for developing a clot continues for up to 2-3 months after surgery.  You should avoid prolonged sitting and dehydration during that time (long air trips and car trips).  If you do take a trip during this time, please get up and move around every 1- 1.5 hours.  - If you are prescribed blood thinning medication for home, follow instructions as directed. (Handouts provided if applicable).      Activity    Once you get home, you should stay active. The key is not to overdo it! While you can expect some good days and some bad days, you should notice a gradual improvement over time you should notice a gradual improvement and a gradual increase in your endurance over the next 6 to 12 months.    - Weight Bearing - If you have undergone cemented or hybrid hip replacement, you can put some weight on the leg immediately using a cane or walker, and you should continue to use some support for 4 to 6 weeks to help the muscles recover.   - Sleeping Positions - Sleep on your back with your legs slightly apart or on your side with a regular pillow between your knees. Be sure to use the pillow for at least 6 weeks, or until your doctor says you can do without it. Sleeping on your stomach should be all right  - Sitting - For at least the first 3 months, sit only in chairs that have arms. Do not sit on low chairs, low stools, or reclining chairs. Do not cross your  legs at the knees. The physical therapist will show you how to sit and stand from a chair, keeping your affected leg out in front of you. Get up and move around on a regular basis--at least once every hour.  - Walking - Walk as much as you like once your doctor gives you the go-ahead, but remember that walking is no substitute for your prescribed exercises. Walking with a pair of trekking poles is helpful and adds as much as 40% to the exercise you get when you walk  - Therapy may be needed in some cases, to strengthen your muscles and improve your gait (walking pattern).  This decision will be made at your post-operative appointment.  Follow your therapist recommended post-operative exercises (handout provided by Therapist).  - Swimming is also recommended; you can begin as soon as the sutures have been removed and the wound is healed, approximately 6 to 8 weeks after surgery. Using a pair of training fins may make swimming a more enjoyable and effective exercise.  - Other activities - Lower impact activities are preferred.  If you have specific questions, consult your Surgeon.    - Sexual activity - Your surgeon can tell you when it should be safe to resume sexual activity.      When to Call the Doctor   Call the physician if:   - Fever over 100.5? F  - Increased pain, drainage, redness, odor or heat around the incision area  - Shaking chills  - Increased knee pain with activity and rest  - Increased pain in calf, tenderness or redness above or below the knee  - Increased swelling of calf, ankle, foot  - Sudden increased shortness of breath, sudden onset of chest pain, localized chest pain with coughing  - Incision opening  Or, if there are any questions or concerns about medications or care.       -Is this patient being discharged with medication to prevent blood clots?  Yes, Xarelto Rivaroxaban oral tablets  What is this medicine?  RIVAROXABAN (ri va SHALONDA a ban) is an anticoagulant (blood thinner). It is used to  treat blood clots in the lungs or in the veins. It is also used after knee or hip surgeries to prevent blood clots. It is also used to lower the chance of stroke in people with a medical condition called atrial fibrillation.  This medicine may be used for other purposes; ask your health care provider or pharmacist if you have questions.  COMMON BRAND NAME(S): Xarelto  What should I tell my health care provider before I take this medicine?  They need to know if you have any of these conditions:  -bleeding disorders  -bleeding in the brain  -blood in your stools (black or tarry stools) or if you have blood in your vomit  -history of stomach bleeding  -kidney disease  -liver disease  -low blood counts, like low white cell, platelet, or red cell counts  -recent or planned spinal or epidural procedure  -take medicines that treat or prevent blood clots  -an unusual or allergic reaction to rivaroxaban, other medicines, foods, dyes, or preservatives  -pregnant or trying to get pregnant  -breast-feeding  How should I use this medicine?  Take this medicine by mouth with a glass of water. Follow the directions on the prescription label. Take your medicine at regular intervals. Do not take it more often than directed. Do not stop taking except on your doctor's advice.  If you are taking this medicine after hip or knee replacement surgery, take it with or without food.  If you are taking this medicine for atrial fibrillation, take it with your evening meal. If you are taking this medicine to treat blood clots, take it with food at the same time each day. If you are unable to swallow your tablet, you may crush the tablet and mix it in applesauce. Then, immediately eat the applesauce. You should eat more food right after you eat the applesauce containing the crushed tablet.  Talk to your pediatrician regarding the use of this medicine in children. Special care may be needed.  Overdosage: If you think you've taken too much of this  medicine contact a poison control center or emergency room at once.  Overdosage: If you think you have taken too much of this medicine contact a poison control center or emergency room at once.  NOTE: This medicine is only for you. Do not share this medicine with others.  What if I miss a dose?  If you take your medicine once a day and miss a dose, take the missed dose as soon as you remember. If you take your medicine twice a day and miss a dose, take the missed dose immediately. In this instance, 2 tablets may be taken at the same time. The next day you should take 1 tablet twice a day as directed.  What may interact with this medicine?  -aspirin and aspirin-like medicines  -certain antibiotics like erythromycin, azithromycin, and clarithromycin  -certain medicines for fungal infections like ketoconazole and itraconazole  -certain medicines for irregular heart beat like amiodarone, quinidine, dronedarone  -certain medicines for seizures like carbamazepine, phenytoin  -certain medicines that treat or prevent blood clots like warfarin, enoxaparin, and dalteparin   -conivaptan  -diltiazem  -felodipine  -indinavir  -lopinavir; ritonavir  -NSAIDS, medicines for pain and inflammation, like ibuprofen or naproxen  -ranolazine  -rifampin  -ritonavir  -Ryland's wort  -verapamil  This list may not describe all possible interactions. Give your health care provider a list of all the medicines, herbs, non-prescription drugs, or dietary supplements you use. Also tell them if you smoke, drink alcohol, or use illegal drugs. Some items may interact with your medicine.  What should I watch for while using this medicine?  Do not stop taking this medicine without first talking to your doctor. Stopping this medicine may increase your risk of having a stroke. Be sure to refill your prescription before you run out of medicine.  This medicine may increase your risk to bruise or bleed. Call your doctor or health care professional if you  notice any unusual bleeding.  Be careful brushing and flossing your teeth or using a toothpick because you may bleed more easily. If you have any dental work done, tell your dentist you are receiving this medicine.  What side effects may I notice from receiving this medicine?  Side effects that you should report to your doctor or health care professional as soon as possible:  -allergic reactions like skin rash, itching or hives, swelling of the face, lips, or tongue  -back pain  -bloody or black, tarry stools  -changes in vision  -confusion, trouble speaking or understanding  -red or dark-brown urine  -redness, blistering, peeling or loosening of the skin, including inside the mouth  -severe headaches  -spitting up blood or brown material that looks like coffee grounds  -sudden numbness or weakness of the face, arm or leg  -trouble walking, dizziness, loss of balance or coordination  -unusual bruising or bleeding from the eye, gums, or nose   Side effects that usually do not require medical attention (Report these to your doctor or health care professional if they continue or are bothersome.):  -dizziness  -muscle pain  This list may not describe all possible side effects. Call your doctor for medical advice about side effects. You may report side effects to FDA at 0-805-FDA-3382.  Where should I keep my medicine?  Keep out of the reach of children.  Store at room temperature between 15 and 30 degrees C (59 and 86 degrees F). Throw away any unused medicine after the expiration date.  NOTE: This sheet is a summary. It may not cover all possible information. If you have questions about this medicine, talk to your doctor, pharmacist, or health care provider.  © 2014, [employer]. (3/17/2014 3:32:09 PM)      · Is patient discharged on Warfarin / Coumadin?   No     · Is patient Post Blood Transfusion?  No    Depression / Suicide Risk    As you are discharged from this [facility] facility, it is important to  learn how to keep safe from harming yourself.    Recognize the warning signs:  · Abrupt changes in personality, positive or negative- including increase in energy   · Giving away possessions  · Change in eating patterns- significant weight changes-  positive or negative  · Change in sleeping patterns- unable to sleep or sleeping all the time   · Unwillingness or inability to communicate  · Depression  · Unusual sadness, discouragement and loneliness  · Talk of wanting to die  · Neglect of personal appearance   · Rebelliousness- reckless behavior  · Withdrawal from people/activities they love  · Confusion- inability to concentrate     If you or a loved one observes any of these behaviors or has concerns about self-harm, here's what you can do:  · Talk about it- your feelings and reasons for harming yourself  · Remove any means that you might use to hurt yourself (examples: pills, rope, extension cords, firearm)  · Get professional help from the community (Mental Health, Substance Abuse, psychological counseling)  · Do not be alone:Call your Safe Contact- someone whom you trust who will be there for you.  · Call your local CRISIS HOTLINE 003-2226 or 023-343-2053  · Call your local Children's Mobile Crisis Response Team [place] (049) 629-6312 or www.[employer]  · Call the toll free National Suicide Prevention Hotlines   · National Suicide Prevention Lifeline 389-607-NVVR (6403)  · National Hope Line Network 800-SUICIDE (616-9854)        Your appointments     Oct 13, 2017 10:00 AM   Follow UP with DEMETRICE Amaral   [facility] (--)    [address]  [place] [id number] [phone]              Follow-up Information     1. Follow up with Froylan Gunter M.D..    Specialty:  Orthopaedics    Why:  As scheduled    Contact information    [address]  [address]  [place] [id number] [phone]           Discharge Medication Instructions:    Below are the  medications your physician expects you to take upon discharge:    Review all your home medications and newly ordered medications with your doctor and/or pharmacist. Follow medication instructions as directed by your doctor and/or pharmacist.    Please keep your medication list with you and share with your physician.               Medication List      START taking these medications        Instructions    oxycodone immediate-release 5 MG Tabs   Commonly known as:  ROXICODONE   Next Dose Due:  As prescribed    Take 1 Tab by mouth every 3 hours as needed (Severe Pain (NRS Pain Scale 7-10; CPOT Pain Scale 6-8)).   Dose:  5 mg         CONTINUE taking these medications        Instructions    XARELTO 15 MG Tabs tablet   Last time this was given:  10 mg on 4/7/2017  5:58 AM   Generic drug:  rivaroxaban   Next Dose Due:  4/8/2017    Take 15 mg by mouth every bedtime. Atrial Fib   Dose:  15 mg         ASK your doctor about these medications        Instructions    acetaminophen 500 MG Tabs   Last time this was given:  1,000 mg on 4/7/2017  5:58 AM   Commonly known as:  TYLENOL   Next Dose Due:  As prescribed    Take 500-1,000 mg by mouth Pre-Op Once.   Dose:  500-1000 mg       ATROVENT NA   Next Dose Due:  As prescribed    Spray 2 Sprays in nose 2 Times a Day.   Dose:  2 Spray       CALCIUM 500 PO   Next Dose Due:  As prescribed    Take 2 Tabs by mouth every day.   Dose:  2 Tab       celecoxib 200 MG Caps   Commonly known as:  CELEBREX   Next Dose Due:  Pre-op only    Take 200 mg by mouth Pre-Op Once.   Dose:  200 mg       famotidine 40 MG Tabs   Commonly known as:  PEPCID   Next Dose Due:  As prescribed    Take 40 mg by mouth every evening.   Dose:  40 mg       gabapentin 600 MG tablet   Commonly known as:  NEURONTIN   Next Dose Due:  Pre-op only    Take 600 mg by mouth Pre-Op Once.   Dose:  600 mg       levothyroxine 100 MCG Tabs   Last time this was given:  100 mcg on 4/7/2017  5:58 AM   Commonly known as:  SYNTHROID   Next  Dose Due:  4/8/2017    Take 100 mcg by mouth Every morning on an empty stomach.   Dose:  100 mcg       LIPITOR 40 MG Tabs   Last time this was given:  40 mg on 4/6/2017  8:00 PM   Generic drug:  atorvastatin   Next Dose Due:  4/7/2017 9am    Take 40 mg by mouth every evening.   Dose:  40 mg       MULTI-VITAMIN PO   Next Dose Due:  As prescribed    Take 1 Tab by mouth every evening.   Dose:  1 Tab       tamsulosin 0.4 MG capsule   Last time this was given:  0.4 mg on 4/7/2017  8:48 AM   Commonly known as:  FLOMAX   Next Dose Due:  4/8/2017    Take 0.4 mg by mouth every evening.   Dose:  0.4 mg       testosterone cypionate 200 MG/ML Soln injection   Commonly known as:  DEPO-TESTOSTERONE   Next Dose Due:  As prescribed    100 mg by Intramuscular route every 14 days.   Dose:  100 mg       trospium 20 MG Tabs   Commonly known as:  SANCTURA   Next Dose Due:  4/7/2017 9pm    Take 20 mg by mouth 2 Times a Day.   Dose:  20 mg       Vitamin D3 5000 UNITS Tabs   Last time this was given:  5,000 Units on 4/7/2017  8:48 AM   Next Dose Due:  4/8/2017    Take 1 Tab by mouth every day.   Dose:  1 Tab               Instructions           Diet / Nutrition:    Follow any diet instructions given to you by your doctor or the dietician, including how much salt (sodium) you are allowed each day.    If you are overweight, talk to your doctor about a weight reduction plan.    Activity:    Remain physically active following your doctor's instructions about exercise and activity.    Rest often.     Any time you become even a little tired or short of breath, SIT DOWN and rest.    Worsening Symptoms:    Report any of the following signs and symptoms to the doctor's office immediately:    *Pain of jaw, arm, or neck  *Chest pain not relieved by medication                               *Dizziness or loss of consciousness  *Difficulty breathing even when at rest   *More tired than usual                                       *Bleeding drainage or  swelling of surgical site  *Swelling of feet, ankles, legs or stomach                 *Fever (>100ºF)  *Pink or blood tinged sputum  *Weight gain (3lbs/day or 5lbs /week)           *Shock from internal defibrillator (if applicable)  *Palpitations or irregular heartbeats                *Cool and/or numb extremities    Stroke Awareness    Common Risk Factors for Stroke include:    Age  Atrial Fibrillation  Carotid Artery Stenosis  Diabetes Mellitus  Excessive alcohol consumption  High blood pressure  Overweight   Physical inactivity  Smoking    Warning signs and symptoms of a stroke include:    *Sudden numbness or weakness of the face, arm or leg (especially on one side of the body).  *Sudden confusion, trouble speaking or understanding.  *Sudden trouble seeing in one or both eyes.  *Sudden trouble walking, dizziness, loss of balance or coordination.Sudden severe headache with no known cause.    It is very important to get treatment quickly when a stroke occurs. If you experience any of the above warning signs, call 911 immediately.                   Disclaimer         Quit Smoking / Tobacco Use:    I understand the use of any tobacco products increases my chance of suffering from future heart disease or stroke and could cause other illnesses which may shorten my life. Quitting the use of tobacco products is the single most important thing I can do to improve my health. For further information on smoking / tobacco cessation call a Toll Free Quit Line at 1-457.399.6680 (*[employer] [place]) or 1-189.583.2017 (American Lung Association) or you can access the web based program at www.lungusa.org.    [place] Tobacco Users Help Line:  (794) 543-2890       Toll Free: 1-432.861.5022  Quit Tobacco Program [facility] [phone]    Crisis Hotline:    [place] Crisis Hotline:  6-359-GYVTHDZ or 1-147.290.8496    [place] Crisis Hotline:    1-414.154.3111 or 343-356-0716    Discharge Survey:   Thank  you for choosing [facility]. We hope we did everything we could to make your hospital stay a pleasant one. You may be receiving a phone survey and we would appreciate your time and participation in answering the questions. Your input is very valuable to us in our efforts to improve our service to our patients and their families.        My signature on this form indicates that:    1. I have reviewed and understand the above information.  2. My questions regarding this information have been answered to my satisfaction.  3. I have formulated a plan with my discharge nurse to obtain my prescribed medications for home.                  Disclaimer         __________________________________                     __________       ________                       Patient Signature                                                 Date                    Time

## 2017-04-06 NOTE — OR SURGEON
Immediate Post-Operative Note      PreOp Diagnosis: left hip arthritis    PostOp Diagnosis: same    Procedure(s):  HIP ARTHROPLASTY ANTERIOR TOTAL - Wound Class: Clean    Surgeon(s):  Froylan Gunter M.D.    Anesthesiologist/Type of Anesthesia:  Anesthesiologist: Hetal Montanez M.D./General    Surgical Staff:  Circulator: Maryse Paulino R.N.  Limb Frias: Kennedy Reyes  Relief Circulator: Clovis Simon R.N.  Scrub Person: Karen Alba  Radiology Technologist: Jatin Skelton    Specimen: none    Estimated Blood Loss: 100 mL    Findings: see operative note    Complications: none        4/6/2017 10:31 AM Quintin Saez

## 2017-04-06 NOTE — IP AVS SNAPSHOT
" <p align=\"LEFT\"><IMG SRC=\"//EMRWB/blob$/[email or web address]\" alt=\"Image\" WIDTH=\"50%\" HEIGHT=\"200\" BORDER=\"\"></p>                   Name:Gumaro Garsia  Medical Record Number:[id number]  CSN: [id number]    YOB: 1931   Age: 86 y.o.  Sex: male  HT:1.778 m (5' 10\") WT: 88.9 kg (195 lb 15.8 oz)          Admit Date: 4/6/2017     Discharge Date:   Today's Date: 4/7/2017  Attending Doctor:  Froylan Gunter M.D.                  Allergies:  Review of patient's allergies indicates no known allergies.          Your appointments     Oct 13, 2017 10:00 AM   Follow UP with DEMETRICE Amaral   [facility] Sleep Medicine (--)    [address]  [place] [id number] [phone]              Follow-up Information     1. Follow up with Froylan Gunter M.D..    Specialty:  Orthopaedics    Why:  As scheduled    Contact information    [address]  [address]  [place] [address]  [phone]           Medication List      Take these Medications        Instructions    oxycodone immediate-release 5 MG Tabs   Commonly known as:  ROXICODONE    Take 1 Tab by mouth every 3 hours as needed (Severe Pain (NRS Pain Scale 7-10; CPOT Pain Scale 6-8)).   Dose:  5 mg       XARELTO 15 MG Tabs tablet   Generic drug:  rivaroxaban    Take 15 mg by mouth every bedtime. Atrial Fib   Dose:  15 mg         Ask your Physician about these medications        Instructions    acetaminophen 500 MG Tabs   Commonly known as:  TYLENOL    Take 500-1,000 mg by mouth Pre-Op Once.   Dose:  500-1000 mg       ATROVENT NA    Spray 2 Sprays in nose 2 Times a Day.   Dose:  2 Spray       CALCIUM 500 PO    Take 2 Tabs by mouth every day.   Dose:  2 Tab       celecoxib 200 MG Caps   Commonly known as:  CELEBREX    Take 200 mg by mouth Pre-Op Once.   Dose:  200 mg       famotidine 40 MG Tabs   Commonly known as:  PEPCID    Take 40 mg by mouth every evening.   Dose:  40 mg       gabapentin 600 MG tablet   Commonly known as:  NEURONTIN   " Take 600 mg by mouth Pre-Op Once.   Dose:  600 mg       levothyroxine 100 MCG Tabs   Commonly known as:  SYNTHROID    Take 100 mcg by mouth Every morning on an empty stomach.   Dose:  100 mcg       LIPITOR 40 MG Tabs   Generic drug:  atorvastatin    Take 40 mg by mouth every evening.   Dose:  40 mg       MULTI-VITAMIN PO    Take 1 Tab by mouth every evening.   Dose:  1 Tab       tamsulosin 0.4 MG capsule   Commonly known as:  FLOMAX    Take 0.4 mg by mouth every evening.   Dose:  0.4 mg       testosterone cypionate 200 MG/ML Soln injection   Commonly known as:  DEPO-TESTOSTERONE    100 mg by Intramuscular route every 14 days.   Dose:  100 mg       trospium 20 MG Tabs   Commonly known as:  SANCTURA    Take 20 mg by mouth 2 Times a Day.   Dose:  20 mg       Vitamin D3 5000 UNITS Tabs    Take 1 Tab by mouth every day.   Dose:  1 Tab

## 2017-04-06 NOTE — IP AVS SNAPSHOT
QR Artist Access Code: Activation code not generated  Current QR Artist Status: Active    [employer]  A secure, online tool to manage your health information     [employer]’s QR Artist® is a secure, online tool that connects you to your personalized health information from the privacy of your home -- day or night - making it very easy for you to manage your healthcare. Once the activation process is completed, you can even access your medical information using the QR Artist alfonso, which is available for free in the Apple Alfonso store or Google Play store.     QR Artist provides the following levels of access (as shown below):   My Chart Features   [facility] Primary Care Doctor [facility]  Specialists [facility]  Urgent  Care Non-[facility]  Primary Care  Doctor   Email your healthcare team securely and privately 24/7 X X X X   Manage appointments: schedule your next appointment; view details of past/upcoming appointments X      Request prescription refills. X      View recent personal medical records, including lab and immunizations X X X X   View health record, including health history, allergies, medications X X X X   Read reports about your outpatient visits, procedures, consult and ER notes X X X X   See your discharge summary, which is a recap of your hospital and/or ER visit that includes your diagnosis, lab results, and care plan. X X       How to register for QR Artist:  1. Go to  [email or web address].  2. Click on the Sign Up Now box, which takes you to the New Member Sign Up page. You will need to provide the following information:  a. Enter your QR Artist Access Code exactly as it appears at the top of this page. (You will not need to use this code after you’ve completed the sign-up process. If you do not sign up before the expiration date, you must request a new code.)   b. Enter your date of birth.   c. Enter your home email address.   d. Click Submit, and follow the next screen’s instructions.  3. Create a QR Artist ID. This will  be your [employer] App login ID and cannot be changed, so think of one that is secure and easy to remember.  4. Create a [employer] App password. You can change your password at any time.  5. Enter your Password Reset Question and Answer. This can be used at a later time if you forget your password.   6. Enter your e-mail address. This allows you to receive e-mail notifications when new information is available in [employer] App.  7. Click Sign Up. You can now view your health information.    For assistance activating your [employer] App account, call [phone]

## 2017-04-06 NOTE — PROGRESS NOTES
Patient ambulated 50 feet in room, voided in bathroom. Up to chair. Tolerating regular diet. Pain well controlled. Complains of slight lightheadedness. Currently on 4L Oxygen, incentive spirometer given and instructions on use provided. Continuous pulse oximetry in place.

## 2017-04-06 NOTE — IP AVS SNAPSHOT
4/7/2017          Gumaro Garsia  [address], [place] [address]    Dear Gumaro:    [facility] wants to ensure your discharge home is safe and you or your loved ones have had all your questions answered regarding your care after you leave the hospital.    You may receive a telephone call within two days of your discharge.  This call is to make certain you understand your discharge instructions as well as ensure we provided you with the best care possible during your stay with us.     The call will only last approximately 3-5 minutes and will be done by a nurse.    Once again, we want to ensure your discharge home is safe and that you have a clear understanding of any next steps in your care.  If you have any questions or concerns, please do not hesitate to contact us, we are here for you.  Thank you for choosing [facility] for your healthcare needs.    Sincerely,    David Dorman    [facility]

## 2017-04-07 VITALS
RESPIRATION RATE: 17 BRPM | SYSTOLIC BLOOD PRESSURE: 116 MMHG | WEIGHT: 195.99 LBS | BODY MASS INDEX: 28.06 KG/M2 | OXYGEN SATURATION: 93 % | HEART RATE: 67 BPM | HEIGHT: 70 IN | TEMPERATURE: 98.1 F | DIASTOLIC BLOOD PRESSURE: 64 MMHG

## 2017-04-07 LAB
HCT VFR BLD AUTO: 37.5 % (ref 42–52)
HGB BLD-MCNC: 12.8 G/DL (ref 14–18)

## 2017-04-07 PROCEDURE — 97165 OT EVAL LOW COMPLEX 30 MIN: CPT | Mod: XE

## 2017-04-07 PROCEDURE — G8980 MOBILITY D/C STATUS: HCPCS | Mod: CI

## 2017-04-07 PROCEDURE — 97162 PT EVAL MOD COMPLEX 30 MIN: CPT

## 2017-04-07 PROCEDURE — G8989 SELF CARE D/C STATUS: HCPCS | Mod: CI

## 2017-04-07 PROCEDURE — A9270 NON-COVERED ITEM OR SERVICE: HCPCS | Performed by: ORTHOPAEDIC SURGERY

## 2017-04-07 PROCEDURE — G8987 SELF CARE CURRENT STATUS: HCPCS | Mod: CI

## 2017-04-07 PROCEDURE — 700111 HCHG RX REV CODE 636 W/ 250 OVERRIDE (IP): Performed by: ORTHOPAEDIC SURGERY

## 2017-04-07 PROCEDURE — 700102 HCHG RX REV CODE 250 W/ 637 OVERRIDE(OP)

## 2017-04-07 PROCEDURE — 700112 HCHG RX REV CODE 229: Performed by: ORTHOPAEDIC SURGERY

## 2017-04-07 PROCEDURE — 36415 COLL VENOUS BLD VENIPUNCTURE: CPT

## 2017-04-07 PROCEDURE — 85018 HEMOGLOBIN: CPT

## 2017-04-07 PROCEDURE — G8978 MOBILITY CURRENT STATUS: HCPCS | Mod: CI

## 2017-04-07 PROCEDURE — 700102 HCHG RX REV CODE 250 W/ 637 OVERRIDE(OP): Performed by: ORTHOPAEDIC SURGERY

## 2017-04-07 PROCEDURE — G8979 MOBILITY GOAL STATUS: HCPCS | Mod: CI

## 2017-04-07 PROCEDURE — G8988 SELF CARE GOAL STATUS: HCPCS | Mod: CI

## 2017-04-07 PROCEDURE — 85014 HEMATOCRIT: CPT

## 2017-04-07 PROCEDURE — A9270 NON-COVERED ITEM OR SERVICE: HCPCS

## 2017-04-07 RX ORDER — OXYCODONE HYDROCHLORIDE 5 MG/1
5 TABLET ORAL
Qty: 30 TAB | Refills: 0 | Status: SHIPPED | OUTPATIENT
Start: 2017-04-07 | End: 2017-11-07

## 2017-04-07 RX ADMIN — TAMSULOSIN HYDROCHLORIDE 0.4 MG: 0.4 CAPSULE ORAL at 08:48

## 2017-04-07 RX ADMIN — DEXAMETHASONE SODIUM PHOSPHATE 10 MG: 4 INJECTION, SOLUTION INTRAMUSCULAR; INTRAVENOUS at 05:58

## 2017-04-07 RX ADMIN — ACETAMINOPHEN 1000 MG: 500 TABLET, FILM COATED ORAL at 05:58

## 2017-04-07 RX ADMIN — CEFAZOLIN SODIUM 2 G: 2 INJECTION, SOLUTION INTRAVENOUS at 01:00

## 2017-04-07 RX ADMIN — LEVOTHYROXINE SODIUM 100 MCG: 25 TABLET ORAL at 05:58

## 2017-04-07 RX ADMIN — VITAMIN D, TAB 1000IU (100/BT) 5000 UNITS: 25 TAB at 08:48

## 2017-04-07 RX ADMIN — DOCUSATE SODIUM 100 MG: 100 CAPSULE ORAL at 08:48

## 2017-04-07 RX ADMIN — RIVAROXABAN 10 MG: 10 TABLET, FILM COATED ORAL at 05:58

## 2017-04-07 RX ADMIN — OXYBUTYNIN CHLORIDE 5 MG: 5 TABLET ORAL at 08:48

## 2017-04-07 ASSESSMENT — GAIT ASSESSMENTS
ASSISTIVE DEVICE: FRONT WHEEL WALKER
DISTANCE (FEET): 150
GAIT LEVEL OF ASSIST: STAND BY ASSIST
DEVIATION: ANTALGIC

## 2017-04-07 ASSESSMENT — ACTIVITIES OF DAILY LIVING (ADL): TOILETING: INDEPENDENT

## 2017-04-07 ASSESSMENT — PAIN SCALES - GENERAL
PAINLEVEL_OUTOF10: 0
PAINLEVEL_OUTOF10: 1
PAINLEVEL_OUTOF10: 0

## 2017-04-07 NOTE — CARE PLAN
Problem: Safety  Goal: Free from accidental injury  Intervention: Initiate Fall Precautions  Treaded socks in place.  Call light is within reach.  Bed is locked and in lowest position.  Pt calls appropriately, and FWW is out of pt reach      Problem: Risk for Impaired Mobility  Goal: Activity Level appropriate for Discharge or Transfer  Outcome: PROGRESSING AS EXPECTED  Pt is ambulating around the room and to the restroom and back to bed w/minimal discomfort.

## 2017-04-07 NOTE — PROGRESS NOTES
Patient discharged to home. No IV access. Pain well controlled with PO medications. Voiding adequate amounts without difficulty. Ambulating with steady gait with FWW. Discharge instructions and prescriptions provided. Patient and family verbalized understanding of discharge instructions and have no further questions or concerns at this time

## 2017-04-07 NOTE — CARE PLAN
Problem: Safety  Goal: Free from accidental injury  Outcome: PROGRESSING AS EXPECTED  Patient ambulating independently with steady gait and FWW, calls for assistance as appropriate.    Problem: Pain  Goal: Alleviation of Pain or a reduction in pain to the patient’s comfort goal  Outcome: PROGRESSING AS EXPECTED  Pain well controlled

## 2017-04-07 NOTE — THERAPY
"Occupational Therapy Evaluation completed.   Functional Status:  Supv supine > < EOB, supv transfers with FWW, supv LB dressing without AE, supv toileting/standing grooming   Plan of Care: Patient with no further skilled OT needs in the acute care setting at this time  Discharge Recommendations:  Equipment: No Equipment Needed. Post-acute therapy: Discharge to home with outpatient or home health for additional skilled therapy services.    See \"Rehab Therapy-Acute\" Patient Summary Report for complete documentation.    86 y.o. male s/p L BETY. Pt seen for OT eval. Able to complete basic ADL and transfers with no more than supv. Pt has adequate support at home. No further acute OT needs at this time.     "

## 2017-04-07 NOTE — PROGRESS NOTES
Assumed care of pt at 1900. Pt sitting up in chair. No c/o of pain at this time. Pt ambulated to the bathroom and to bed for 50 ft. Minimal discomfort noted. POC discussed with pt, PIV assessed and is patent. Chart, labs, notes and orders reviewed, bed is locked and in lowest position, hourly rounding in place.

## 2017-04-07 NOTE — DISCHARGE INSTRUCTIONS
Discharge Instructions  *Follow up with Dr. Gunter at scheduled appointment  *Weight bearing as tolerated                     *Activity as tolerated  *Use assistive device for all activity  *Continue exercises provided by physical therapy  *Elevate leg as needed  *Ice as needed (20 minutes every 1-2 hours)  *Keep dressing in place until 04/11/2017 postoperative day #5   *Starting Tuesday remove dressing and shower. Do not soak or scrub incision, after shower pat dry and leave open to air.  *No soaking of the incision; no baths, hot tubs, or swimming until cleared by doctor  * Xarelto 10 mg once a day for blood clot prevention        *Take medications as prescribed by doctor  *Call doctor’s office with any questions or concerns     Discharged to home by car with relative. Discharged via wheelchair, hospital escort: Yes.  Special equipment needed: Walker    Be sure to schedule a follow-up appointment with your primary care doctor or any specialists as instructed.     Discharge Plan:   Diet Plan: Discussed  Activity Level: Discussed  Confirmed Follow up Appointment: Patient to Call and Schedule Appointment  Confirmed Symptoms Management: Discussed  Medication Reconciliation Updated: Yes  Influenza Vaccine Indication: Not indicated: Previously immunized this influenza season and > 8 years of age    I understand that a diet low in cholesterol, fat, and sodium is recommended for good health. Unless I have been given specific instructions below for another diet, I accept this instruction as my diet prescription.   Other diet: Diet as tolerated    Special Instructions: Discharge instructions for the Orthopedic Patient    Follow up with Primary Care Physician within 2 weeks of discharge to home, regarding:  Review of medications and diagnostic testing.  Surveillance for medical complications.  Workup and treatment of osteoporosis, if appropriate.     -Is this a Joint Replacement patient? Yes   Total Joint Hip Replacement  Discharge Instructions    Pain  - The goal is to slowly wean off the prescription pain medicine.  - Ice can be used for pain control.  20 minutes at a time is recommended, and never directly against your skin or incision.  - Most patients are off the pain pills by 3 weeks; others may require a low level of pain medications for many months. If your pain continues to be severe, follow up with your physician.  Infection  Deep hip joint infections that require removal of the prostheses occur in less than 0.1% of patients. Lesser infections in the skin (cellulites) are more common and much more easily treated.  - Keep the incision as clean and dry as possible.  - Always wash your hands before touching your incision.  - Skin infections tend to develop around 7-10 days after surgery, most can be treated with oral antibiotics.  - Dental Care should be delayed for 3 months after surgery, your surgeon recommends taking a dose of antibiotics 1 hour prior to any dental procedure.  After 2 years, most surgeons recommend antibiotics only before an extensive procedure.  Ask your surgeon what he recommends.  - Signs and symptoms of infection can include:  low grade fever, redness, pain, swelling and drainage from your incision.  Notify your surgeon immediately if you develop any of these symptoms.  Post op Disturbances  - Bowel habits - constipation is extremely common and is caused by a combination of anesthesia, lack of mobility and pain medicine.  Use stool softeners or laxatives if necessary. It is important not to ignore this problem, as bowel obstructions can be a serious complication after joint replacement surgery.  - Mood/Energy Level - Many patients experience a lack of energy and endurance for up to 2-3 months after surgery.  Some may also feel down and can even become depressed.  This is likely due to the postoperative anemia, change in activity level, lack of sleep, pain medicine and just the emotional reaction to the  surgery itself that is a big disruption in a person’s life.  This usually passes.  If symptoms persist, follow up with your primary physician.  - Returning to work - Your surgeon will give you more specific instructions.  Generally, if you work a sedentary job requiring little standing or walking, most patients may return within 2-6 weeks.  Manual labor jobs involving walking, lifting and standing may take 3-4 months.  Your surgeon’s office can provide a release to part-time or light duty work early on in your recovery and progress you to full duty as able.  - Driving - You can begin driving an automatic shift car in 4 to 8 weeks, provided you are no longer taking narcotic pain medication. If you have a stick-shift car and your right hip was replaced, do not begin driving until your doctor says you can.   - Avoiding falls -  throw rugs and tack down loose carpeting.  Be aware of floor hazards such as pets, small objects or uneven surfaces.   -  Airport Metal Detectors - The sensitivity of metal detectors varies and it is likely that your prosthesis will cause an alarm. Inform the  that you have an artificial joint.  Diet  - Resume your normal diet as tolerated.  - It is important to achieve a healthy nutritional status by eating a well balanced diet on a regular basis.  - Your physician may recommend that you take iron and vitamin supplements.   - Continue to drink plenty of fluids.  Shower/Bathing  - You may shower as soon as you get home from the hospital unless otherwise instructed.  - Keep your incision out of water.  To keep the incision dry when showering, cover it with a plastic bag or plastic wrap.  - Pat incision dry if it gets wet.  Don’t rub.  - Do not submerge in a bath until staples are out and the incision is completely healed. (Approximately 6-8 weeks after surgery).  Dressing Change:  Procedure (if recommended by your physician)  - Wash hands.  - Open all dressing change  materials.  - Remove old dressing and discard.  - Inspect incision for redness, increase in clear drainage, yellow/green drainage, odor and surrounding skin hot to touch.  -  ABD (large gauze) pad by one corner and lay over the incision.  Be careful not to touch the inside of the dressing that will lay over the incision.  - Secure in place as instructed (Ace wrap or tape).    Swelling/Bruising  - Swelling is normal after hip replacement and can involve the thigh, knee, calf and foot.  - Swelling can last from 3-6 months.  - Elevate your leg higher than your heart while reclining.  The first week you are home you should elevate your leg an equal amount of time, as you are active.    - Anti-inflammatory pills can be taken once you have stopped the blood thinners.  - The swelling is usually worse after you go home since you are upright for longer periods of time.  - Bruising is common and can involve the entire leg including the thigh, calf and even foot.  Bruising often does not appear until after you arrive home and it can be quite dramatic- purple, black, green.  The bruising you can see is not usually concerning and will subside without any treatment.      Blood Clot Prevention  Blood clots in the legs and the less common, but frightening, clots that travel to the lungs are a real focus of our preventative. Most patients are at standard risk for them, but those patients who are at higher risk include people who have had previous clots, a family history of clotting, smoking, diabetes, obesity, advanced age, use of estrogen and a sedentary lifestyle.    - Signs of blood clots in legs - Swelling in thigh, calf or ankle that does not go down with elevation.  Pain, heat and tenderness in calf, back of calf or groin area.  NOTE: blood clots can occur in either leg.  - You have been receiving anticoagulant therapy (blood thinners) in the hospital and you may be instructed to continue at home depending on your risk  factors.  - Your risk for developing a clot continues for up to 2-3 months after surgery.  You should avoid prolonged sitting and dehydration during that time (long air trips and car trips).  If you do take a trip during this time, please get up and move around every 1- 1.5 hours.  - If you are prescribed blood thinning medication for home, follow instructions as directed. (Handouts provided if applicable).      Activity    Once you get home, you should stay active. The key is not to overdo it! While you can expect some good days and some bad days, you should notice a gradual improvement over time you should notice a gradual improvement and a gradual increase in your endurance over the next 6 to 12 months.    - Weight Bearing - If you have undergone cemented or hybrid hip replacement, you can put some weight on the leg immediately using a cane or walker, and you should continue to use some support for 4 to 6 weeks to help the muscles recover.   - Sleeping Positions - Sleep on your back with your legs slightly apart or on your side with a regular pillow between your knees. Be sure to use the pillow for at least 6 weeks, or until your doctor says you can do without it. Sleeping on your stomach should be all right  - Sitting - For at least the first 3 months, sit only in chairs that have arms. Do not sit on low chairs, low stools, or reclining chairs. Do not cross your legs at the knees. The physical therapist will show you how to sit and stand from a chair, keeping your affected leg out in front of you. Get up and move around on a regular basis--at least once every hour.  - Walking - Walk as much as you like once your doctor gives you the go-ahead, but remember that walking is no substitute for your prescribed exercises. Walking with a pair of trekking poles is helpful and adds as much as 40% to the exercise you get when you walk  - Therapy may be needed in some cases, to strengthen your muscles and improve your gait  (walking pattern).  This decision will be made at your post-operative appointment.  Follow your therapist recommended post-operative exercises (handout provided by Therapist).  - Swimming is also recommended; you can begin as soon as the sutures have been removed and the wound is healed, approximately 6 to 8 weeks after surgery. Using a pair of training fins may make swimming a more enjoyable and effective exercise.  - Other activities - Lower impact activities are preferred.  If you have specific questions, consult your Surgeon.    - Sexual activity - Your surgeon can tell you when it should be safe to resume sexual activity.      When to Call the Doctor   Call the physician if:   - Fever over 100.5? F  - Increased pain, drainage, redness, odor or heat around the incision area  - Shaking chills  - Increased knee pain with activity and rest  - Increased pain in calf, tenderness or redness above or below the knee  - Increased swelling of calf, ankle, foot  - Sudden increased shortness of breath, sudden onset of chest pain, localized chest pain with coughing  - Incision opening  Or, if there are any questions or concerns about medications or care.       -Is this patient being discharged with medication to prevent blood clots?  Yes, Xarelto Rivaroxaban oral tablets  What is this medicine?  RIVAROXABAN (ri va SHALONDA a ban) is an anticoagulant (blood thinner). It is used to treat blood clots in the lungs or in the veins. It is also used after knee or hip surgeries to prevent blood clots. It is also used to lower the chance of stroke in people with a medical condition called atrial fibrillation.  This medicine may be used for other purposes; ask your health care provider or pharmacist if you have questions.  COMMON BRAND NAME(S): Xarelto  What should I tell my health care provider before I take this medicine?  They need to know if you have any of these conditions:  -bleeding disorders  -bleeding in the brain  -blood in your  stools (black or tarry stools) or if you have blood in your vomit  -history of stomach bleeding  -kidney disease  -liver disease  -low blood counts, like low white cell, platelet, or red cell counts  -recent or planned spinal or epidural procedure  -take medicines that treat or prevent blood clots  -an unusual or allergic reaction to rivaroxaban, other medicines, foods, dyes, or preservatives  -pregnant or trying to get pregnant  -breast-feeding  How should I use this medicine?  Take this medicine by mouth with a glass of water. Follow the directions on the prescription label. Take your medicine at regular intervals. Do not take it more often than directed. Do not stop taking except on your doctor's advice.  If you are taking this medicine after hip or knee replacement surgery, take it with or without food.  If you are taking this medicine for atrial fibrillation, take it with your evening meal. If you are taking this medicine to treat blood clots, take it with food at the same time each day. If you are unable to swallow your tablet, you may crush the tablet and mix it in applesauce. Then, immediately eat the applesauce. You should eat more food right after you eat the applesauce containing the crushed tablet.  Talk to your pediatrician regarding the use of this medicine in children. Special care may be needed.  Overdosage: If you think you've taken too much of this medicine contact a poison control center or emergency room at once.  Overdosage: If you think you have taken too much of this medicine contact a poison control center or emergency room at once.  NOTE: This medicine is only for you. Do not share this medicine with others.  What if I miss a dose?  If you take your medicine once a day and miss a dose, take the missed dose as soon as you remember. If you take your medicine twice a day and miss a dose, take the missed dose immediately. In this instance, 2 tablets may be taken at the same time. The next day  you should take 1 tablet twice a day as directed.  What may interact with this medicine?  -aspirin and aspirin-like medicines  -certain antibiotics like erythromycin, azithromycin, and clarithromycin  -certain medicines for fungal infections like ketoconazole and itraconazole  -certain medicines for irregular heart beat like amiodarone, quinidine, dronedarone  -certain medicines for seizures like carbamazepine, phenytoin  -certain medicines that treat or prevent blood clots like warfarin, enoxaparin, and dalteparin   -conivaptan  -diltiazem  -felodipine  -indinavir  -lopinavir; ritonavir  -NSAIDS, medicines for pain and inflammation, like ibuprofen or naproxen  -ranolazine  -rifampin  -ritonavir  -[place]'s wort  -verapamil  This list may not describe all possible interactions. Give your health care provider a list of all the medicines, herbs, non-prescription drugs, or dietary supplements you use. Also tell them if you smoke, drink alcohol, or use illegal drugs. Some items may interact with your medicine.  What should I watch for while using this medicine?  Do not stop taking this medicine without first talking to your doctor. Stopping this medicine may increase your risk of having a stroke. Be sure to refill your prescription before you run out of medicine.  This medicine may increase your risk to bruise or bleed. Call your doctor or health care professional if you notice any unusual bleeding.  Be careful brushing and flossing your teeth or using a toothpick because you may bleed more easily. If you have any dental work done, tell your dentist you are receiving this medicine.  What side effects may I notice from receiving this medicine?  Side effects that you should report to your doctor or health care professional as soon as possible:  -allergic reactions like skin rash, itching or hives, swelling of the face, lips, or tongue  -back pain  -bloody or black, tarry stools  -changes in vision  -confusion, trouble  speaking or understanding  -red or dark-brown urine  -redness, blistering, peeling or loosening of the skin, including inside the mouth  -severe headaches  -spitting up blood or brown material that looks like coffee grounds  -sudden numbness or weakness of the face, arm or leg  -trouble walking, dizziness, loss of balance or coordination  -unusual bruising or bleeding from the eye, gums, or nose   Side effects that usually do not require medical attention (Report these to your doctor or health care professional if they continue or are bothersome.):  -dizziness  -muscle pain  This list may not describe all possible side effects. Call your doctor for medical advice about side effects. You may report side effects to FDA at 1-655-FDA-5189.  Where should I keep my medicine?  Keep out of the reach of children.  Store at room temperature between 15 and 30 degrees C (59 and 86 degrees F). Throw away any unused medicine after the expiration date.  NOTE: This sheet is a summary. It may not cover all possible information. If you have questions about this medicine, talk to your doctor, pharmacist, or health care provider.  © 2014, [employer]. (3/17/2014 3:32:09 PM)      · Is patient discharged on Warfarin / Coumadin?   No     · Is patient Post Blood Transfusion?  No    Depression / Suicide Risk    As you are discharged from this [facility], it is important to learn how to keep safe from harming yourself.    Recognize the warning signs:  · Abrupt changes in personality, positive or negative- including increase in energy   · Giving away possessions  · Change in eating patterns- significant weight changes-  positive or negative  · Change in sleeping patterns- unable to sleep or sleeping all the time   · Unwillingness or inability to communicate  · Depression  · Unusual sadness, discouragement and loneliness  · Talk of wanting to die  · Neglect of personal appearance   · Rebelliousness- reckless  behavior  · Withdrawal from people/activities they love  · Confusion- inability to concentrate     If you or a loved one observes any of these behaviors or has concerns about self-harm, here's what you can do:  · Talk about it- your feelings and reasons for harming yourself  · Remove any means that you might use to hurt yourself (examples: pills, rope, extension cords, firearm)  · Get professional help from the community (Mental Health, Substance Abuse, psychological counseling)  · Do not be alone:Call your Safe Contact- someone whom you trust who will be there for you.  · Call your local CRISIS HOTLINE 592-5643 or 783-332-7606  · Call your local Children's Mobile Crisis Response Team [place] (043) 901-8131 or www.[employer]  · Call the toll free National Suicide Prevention Hotlines   · National Suicide Prevention Lifeline 445-425-MAGM (2438)  · National Hope Line Network 800-SUICIDE (326-7936)

## 2017-04-07 NOTE — THERAPY
"Physical Therapy Evaluation completed.   Bed Mobility:  Supine to Sit: Supervised (flat bed, no rail )  Transfers: Sit to Stand: Supervised  Gait: Level Of Assist: Stand by Assist with Front-Wheel Walker       Plan of Care: Patient with no further skilled PT needs in the acute care setting at this time  Discharge Recommendations: Equipment: No Equipment Needed. Post-acute therapy Currently anticipate no further skilled therapy needs once patient is discharged from the inpatient setting.    See \"Rehab Therapy-Acute\" Patient Summary Report for complete documentation.     "

## 2017-04-07 NOTE — DISCHARGE SUMMARY
Patient was admitted for a Total Hip Arthroplasty.  Had no complications during the surgery. Did well post-operatively.         Recent Labs      04/07/17   0441   HEMOGLOBIN  12.8*   HEMATOCRIT  37.5*       Active Hospital Problems    Diagnosis   • Unilateral primary osteoarthritis, left hip [M16.12]       Uneventful hospital course.     Medication List      START taking these medications       Instructions    oxycodone immediate-release 5 MG Tabs   Commonly known as:  ROXICODONE    Take 1 Tab by mouth every 3 hours as needed (Severe Pain (NRS Pain Scale 7-10; CPOT Pain Scale 6-8)).   Dose:  5 mg         CONTINUE taking these medications       Instructions    XARELTO 15 MG Tabs tablet   Last time this was given:  10 mg on 4/7/2017  5:58 AM   Generic drug:  rivaroxaban    Take 15 mg by mouth every bedtime. Atrial Fib   Dose:  15 mg         ASK your doctor about these medications       Instructions    acetaminophen 500 MG Tabs   Last time this was given:  1,000 mg on 4/7/2017  5:58 AM   Commonly known as:  TYLENOL    Take 500-1,000 mg by mouth Pre-Op Once.   Dose:  500-1000 mg       ATROVENT NA    Spray 2 Sprays in nose 2 Times a Day.   Dose:  2 Spray       CALCIUM 500 PO    Take 2 Tabs by mouth every day.   Dose:  2 Tab       celecoxib 200 MG Caps   Commonly known as:  CELEBREX    Take 200 mg by mouth Pre-Op Once.   Dose:  200 mg       famotidine 40 MG Tabs   Commonly known as:  PEPCID    Take 40 mg by mouth every evening.   Dose:  40 mg       gabapentin 600 MG tablet   Commonly known as:  NEURONTIN    Take 600 mg by mouth Pre-Op Once.   Dose:  600 mg       levothyroxine 100 MCG Tabs   Last time this was given:  100 mcg on 4/7/2017  5:58 AM   Commonly known as:  SYNTHROID    Take 100 mcg by mouth Every morning on an empty stomach.   Dose:  100 mcg       LIPITOR 40 MG Tabs   Last time this was given:  40 mg on 4/6/2017  8:00 PM   Generic drug:  atorvastatin    Take 40 mg by mouth every evening.   Dose:  40 mg        MULTI-VITAMIN PO    Take 1 Tab by mouth every evening.   Dose:  1 Tab       tamsulosin 0.4 MG capsule   Last time this was given:  0.4 mg on 4/6/2017 12:36 PM   Commonly known as:  FLOMAX    Take 0.4 mg by mouth every evening.   Dose:  0.4 mg       testosterone cypionate 200 MG/ML Soln injection   Commonly known as:  DEPO-TESTOSTERONE    100 mg by Intramuscular route every 14 days.   Dose:  100 mg       trospium 20 MG Tabs   Commonly known as:  SANCTURA    Take 20 mg by mouth 2 Times a Day.   Dose:  20 mg       Vitamin D3 5000 UNITS Tabs   Last time this was given:  5,000 Units on 4/6/2017 12:36 PM    Take 1 Tab by mouth every day.   Dose:  1 Tab             Patient will be discharged home and follow up with Dr. Gunter clinic in 2 weeks, for which the patient already has scheduled.

## 2017-04-07 NOTE — PROGRESS NOTES
"Patient seen and examined  No compliants    Blood pressure 133/54, pulse 66, temperature 36.4 °C (97.6 °F), resp. rate 17, height 1.778 m (5' 10\"), weight 88.9 kg (195 lb 15.8 oz), SpO2 97 %.    Recent Labs      04/07/17   0441   HEMOGLOBIN  12.8*   HEMATOCRIT  37.5*       No acute distress  Dressing clean dry and intact  Neurovascularly intact      Plan:  Discharge home              "

## 2017-11-07 ENCOUNTER — SLEEP CENTER VISIT (OUTPATIENT)
Dept: SLEEP MEDICINE | Facility: MEDICAL CENTER | Age: 82
End: 2017-11-07
Payer: MEDICARE

## 2017-11-07 VITALS
HEART RATE: 61 BPM | WEIGHT: 195 LBS | HEIGHT: 70 IN | OXYGEN SATURATION: 91 % | RESPIRATION RATE: 16 BRPM | SYSTOLIC BLOOD PRESSURE: 128 MMHG | DIASTOLIC BLOOD PRESSURE: 82 MMHG | BODY MASS INDEX: 27.92 KG/M2

## 2017-11-07 DIAGNOSIS — R09.02 HYPOXEMIA: ICD-10-CM

## 2017-11-07 DIAGNOSIS — G47.33 OBSTRUCTIVE SLEEP APNEA: ICD-10-CM

## 2017-11-07 PROCEDURE — 99213 OFFICE O/P EST LOW 20 MIN: CPT | Performed by: NURSE PRACTITIONER

## 2017-11-07 NOTE — PATIENT INSTRUCTIONS
Plan:    1) His compliance card download indicates excellent compliance and adequate treatment. He tends to wake refreshed. However, he is still often tired during the day. Continue Bipap 16/11 CM H20 with 4 LPM 02 bleed in. Sleep hygiene discussed in detail. He is limited in walking due to his hip issues. He does ride his stationary bike 30 minutes a day. He states his PCP recently did serology work up. Will request copies of. I encouraged him to discuss his fatigue with his Cardiologist as well. He states he is scheduled to see his Cardiologist in 2 days. He is also scheduled for a carotid u/s.   2) He is up to date on Influenza vaccination.  3) Follow up in 6 months with repeat compliance card download. He is encouraged to follow up with PCP and Cardiology for his c/o persistent fatigue. If his fatigue does not improve then would recommend an in lab dedicated titration study.

## 2017-11-07 NOTE — PROGRESS NOTES
Chief Complaint   Patient presents with   • Apnea     16/11 - Bipap       HPI:  Gumaro Garsia is a 86 y.o. year old male here today for follow-up on his obstructive sleep apnea. His initial Polysomnogram indicated an AHI of 51 with severe desaturations. He had been on Auto CPAP 10-20 CM H20. CNOX indicated persistent 02 desaturations and 02 bleed in at 2 LPM was recommended. He underwent a dedicated in lab titration study on 4/26/2016 which indicates incomplete titration to CPAP. He was switched to Bipap. He did best on a Bilevel pressure of 18/13 CM with a resultant AHI of 3.5. He did require 02 bleed in at 2 LPM. His compliance card download indicated an AHI of 7.6 with an average use of 6-7 hours at night. Overnight oximetry 11/12/2016 on Bipap with 2 LPM 02 bleed in, indicated a mean 02 saturation of 89.4% with 168 minutes spent with saturations less than 88%. He felt his Bipap pressures were too high. He was waking bloated and was experiencing increased belching in the morning. His pressure was decreased to 16/11 CM and his 02 bleed in was increased to 3 LPM. Repeat oximetry 12/31/2016 indicates a mean 02 saturation of 89.4% with 153 minutes with saturations less than 88%. His repeat compliance card download indicated an AHI of 5.8 with an average use of 7 hours at night. He was recommended increasing his 02 bleed in to 4 LPM at night at his last office visit. Repeat compliance card download today in the office indicates an AHI of 4.8 with an average use of over 7 hours at night. He is tolerating the lower pressure better. He feels he sleeps well at night. He is waking more refreshed. He is occasionally tired during the day. His unsure of whether or not this is related to other conditions. He denies any morning headaches. He has nasal pillows. He does not feel he is opening his mouth during sleep. He denies symptoms of restless leg. He underwent hip surgery since his last office visit. He feels his pain  "has improved.   He does have a history of Atrial fibrillation and is followed by Cardiology.      Past Medical History:   Diagnosis Date   • A-fib (CMS-HCC) 3-23-17   • Arrhythmia     ATRIAL FLUTTER   • Arthritis 3-23-17    \"Everywhere\"   • Atrial flutter (CMS-HCC) 1/4/2012    S/p ablation    • Cancer (CMS-HCC) 2007    PROSTATE TREATED WITH RADIATION    • Cancer (CMS-HCC) 2015    SKIN TREATED WITH MOHS   • Cataract     IOL OU   • Heart burn 3-23-17    Takes PEPCID   • High cholesterol 3-23-17    Controlled with medication   • Lipids abnormal 1/4/2012   • Obstructive sleep apnea 3-23-17    CPAP USE &  Oxygen concentrator 4L nightly   • Pain     right shoulder   • Pain 11/11/15    left knee right hip   • Pain 3-23-17    \"Left Hip & Right Shoulder\"   • Post-nasal drip 3-23-17   • Rotator cuff arthropathy    • Unspecified disorder of thyroid     LOW THYROID   • Urinary bladder disorder 3-23-17    Takes Sanctura   • Urinary incontinence 3-23-17       Past Surgical History:   Procedure Laterality Date   • HIP ARTH ANTERIOR TOTAL Left 4/6/2017    Procedure: HIP ARTHROPLASTY ANTERIOR TOTAL;  Surgeon: Froylan Gunter M.D.;  Location: [facility];  Service:    • KNEE ARTHROPLASTY TOTAL Left 11/24/2015    Procedure: KNEE ARTHROPLASTY TOTAL;  Surgeon: Clovis Cabral M.D.;  Location: [facility];  Service:    • SHOULDER ARTHROSCOPY W/ ROTATOR CUFF REPAIR  5/29/2013    Performed by Clovis Cabral M.D. at [facility]   • SHOULDER DECOMPRESSION ARTHROSCOPIC  5/29/2013    Performed by Clovis Cabral M.D. at [facility]   • LUMBAR LAMINECTOMY DISKECTOMY  4/14/2011    Performed by LUIS GREGORIO at SURGERY [facility]   • LUMBAR DECOMPRESSION  4/14/2011    Performed by LUIS GREGORIO at SURGERY [facility]   • RECOVERY  1/6/2011    Performed by SURGERY, CATH-RECOVERY at SURGERY SAME DAY [facility] ORS   • RECOVERY  12/9/2010    Performed by SURGERY, CATH-RECOVERY at SURGERY SAME " DAY [facility] ORS   • LUMBAR DECOMPRESSION  1996    L2-4 FUSION   • INGUINAL HERNIA REPAIR  1965    LEFT   • APPENDECTOMY  1952   • TONSILLECTOMY  1947   • CATARACT EXTRACTION WITH IOL      OU   • OTHER CARDIAC SURGERY      ablation       Family History   Problem Relation Age of Onset   • Heart Attack Father 79   • Other Mother      Multiple myeloma   • Diabetes     • Cancer     • Heart Disease         Social History     Social History   • Marital status:      Spouse name: N/A   • Number of children: N/A   • Years of education: N/A     Occupational History   • Not on file.     Social History Main Topics   • Smoking status: Never Smoker   • Smokeless tobacco: Never Used   • Alcohol use No   • Drug use: No   • Sexual activity: Not on file     Other Topics Concern   • Not on file     Social History Narrative   • No narrative on file         ROS:  Constitutional: Denies fevers, chills, sweats,  weight loss  Eyes: Denies vision loss, pain, drainage, double vision. Wears glasses  Ears/Nose/Mouth/Throat: Denies rhinitis, nasal congestion, ear ache, difficulty hearing, sore throat, persistent hoarseness, decayed teeth/toothache  Cardiovascular: Denies chest pain, tightness, palpitations, swelling in feet/legs, fainting, difficulty breathing when laying down  Respiratory: Denies shortness of breath, cough, sputum, wheezing, painful breathing, coughing up blood  GI: Denies heartburn, difficulty swallowing, nausea, vomiting, abdominal pain, diarrhea, constipation  : Denies frequent urination, painful urination  Integumentary: Denies rashes, lumps or color changes  MSK: Hip pain  Neurological: Denies frequent headaches, dizziness, weakness  Sleep: See HPI       Current Outpatient Prescriptions   Medication Sig Dispense Refill   • oxycodone immediate-release (ROXICODONE) 5 MG Tab Take 1 Tab by mouth every 3 hours as needed (Severe Pain (NRS Pain Scale 7-10; CPOT Pain Scale 6-8)). 30 Tab 0   • acetaminophen (TYLENOL) 500  "MG Tab Take 500-1,000 mg by mouth Pre-Op Once.     • gabapentin (NEURONTIN) 600 MG tablet Take 600 mg by mouth Pre-Op Once.     • celecoxib (CELEBREX) 200 MG Cap Take 200 mg by mouth Pre-Op Once.     • levothyroxine (SYNTHROID) 100 MCG Tab Take 100 mcg by mouth Every morning on an empty stomach.     • testosterone cypionate (DEPO-TESTOSTERONE) 200 MG/ML Solution injection 100 mg by Intramuscular route every 14 days.     • rivaroxaban (XARELTO) 15 MG Tab tablet Take 15 mg by mouth every bedtime. Atrial Fib     • Ipratropium Bromide (ATROVENT NA) Spray 2 Sprays in nose 2 Times a Day.     • famotidine (PEPCID) 40 MG Tab Take 40 mg by mouth every evening.     • tamsulosin (FLOMAX) 0.4 MG capsule Take 0.4 mg by mouth every evening.     • Cholecalciferol (VITAMIN D3) 5000 UNITS Tab Take 1 Tab by mouth every day.     • Calcium Carbonate (CALCIUM 500 PO) Take 2 Tabs by mouth every day.     • atorvastatin (LIPITOR) 40 MG TABS Take 40 mg by mouth every evening.     • trospium (SANCTURA) 20 MG TABS Take 20 mg by mouth 2 Times a Day.     • Multiple Vitamin (MULTI-VITAMIN PO) Take 1 Tab by mouth every evening.       No current facility-administered medications for this visit.        No Known Allergies    Blood pressure 128/82, pulse 61, resp. rate 16, height 1.778 m (5' 10\"), weight 88.5 kg (195 lb), SpO2 91 %.    PE:   Appearance: Well developed, well nourished, no acute distress  Eyes: PERRL, EOM intact, sclera white, conjunctiva moist  Ears: no lesions or deformities  Hearing: grossly intact  Nose: no lesions or deformities  Oropharynx: tongue normal, posterior pharynx without erythema or exudate  Mallampati Classification: class 4   Neck: supple, trachea midline, no masses   Respiratory effort: no intercostal retractions or use of accessory muscles  Lung auscultation: no rales, rhonchi or wheezes  Heart auscultation: no murmur rub or gallop  Extremities: no cyanosis or edema  Abdomen: soft ,non tender, no masses  Gait and " Station: normal  Digits and nails: no clubbing, cyanosis, petechiae or nodes.  Cranial nerves: grossly intact  Skin: no rashes, lesions or ulcers noted  Orientation: Oriented to time, person and place  Mood and affect: mood and affect appropriate, normal interaction with examiner  Judgement: Intact          Assessment:  1. Obstructive sleep apnea     2. Hypoxemia           Plan:    1) His compliance card download indicates excellent compliance and adequate treatment. He tends to wake refreshed. However, he is still often tired during the day. Continue Bipap 16/11 CM H20 with 4 LPM 02 bleed in. Sleep hygiene discussed in detail. He is limited in walking due to his hip issues. He does ride his stationary bike 30 minutes a day. He states his PCP recently did serology work up. Will request copies of. I encouraged him to discuss his fatigue with his Cardiologist as well. He states he is scheduled to see his Cardiologist in 2 days. He is also scheduled for a carotid u/s.   2) He is up to date on Influenza vaccination.  3) Follow up in 6 months with repeat compliance card download. He is encouraged to follow up with PCP and Cardiology for his c/o persistent fatigue. If his fatigue does not improve then would recommend an in lab dedicated titration study.

## 2017-11-08 ENCOUNTER — HOSPITAL ENCOUNTER (OUTPATIENT)
Dept: RADIOLOGY | Facility: MEDICAL CENTER | Age: 82
End: 2017-11-08
Attending: FAMILY MEDICINE
Payer: MEDICARE

## 2017-11-08 DIAGNOSIS — G45.9 INTERMITTENT CEREBRAL ISCHEMIA: ICD-10-CM

## 2017-11-08 PROCEDURE — 93880 EXTRACRANIAL BILAT STUDY: CPT

## 2018-02-16 ENCOUNTER — TELEPHONE (OUTPATIENT)
Dept: SLEEP MEDICINE | Facility: MEDICAL CENTER | Age: 83
End: 2018-02-16

## 2018-02-16 DIAGNOSIS — G47.33 OSA (OBSTRUCTIVE SLEEP APNEA): ICD-10-CM

## 2018-02-16 NOTE — TELEPHONE ENCOUNTER
Patient needs mask and supplies order sent to DME:  [facility] / ph [phone] / fax [phone]    Last OV 11/7/17  Next OV 6/4/18    Order pended, please sign

## 2018-12-24 ENCOUNTER — SLEEP CENTER VISIT (OUTPATIENT)
Dept: SLEEP MEDICINE | Facility: MEDICAL CENTER | Age: 83
End: 2018-12-24
Payer: MEDICARE

## 2018-12-24 VITALS
OXYGEN SATURATION: 93 % | SYSTOLIC BLOOD PRESSURE: 138 MMHG | BODY MASS INDEX: 27.92 KG/M2 | TEMPERATURE: 97 F | RESPIRATION RATE: 16 BRPM | WEIGHT: 195 LBS | HEART RATE: 69 BPM | DIASTOLIC BLOOD PRESSURE: 68 MMHG | HEIGHT: 70 IN

## 2018-12-24 DIAGNOSIS — G47.33 OBSTRUCTIVE SLEEP APNEA: ICD-10-CM

## 2018-12-24 DIAGNOSIS — R09.02 HYPOXEMIA: ICD-10-CM

## 2018-12-24 PROCEDURE — 99213 OFFICE O/P EST LOW 20 MIN: CPT | Performed by: NURSE PRACTITIONER

## 2018-12-24 NOTE — PATIENT INSTRUCTIONS
Plan:    1) Continue Bipap @ 16/11 CM H20 with 4 LPM 02 bleed in. Fit for new nasal mask today in the office. RX for new mask and supplies sent to his DME. He does have some persistent fatigue. We discussed an in lab dedicated titration study. He would like to hold off at this time. He feels his fatigue may be related to other health conditions and medications.   2) Sleep hygiene discussed.   3) Continue to follow with Cardiology.  4) Up to date on Influenza vaccination.   5) Follow up in 6 months with compliance card download, sooner if needed.

## 2018-12-24 NOTE — PROGRESS NOTES
Chief Complaint   Patient presents with   • Apnea     last seen 11/7/17       HPI:  Gumaro Garsia is a 87 y.o. year old male here today for follow-up on his obstructive sleep apnea. He was last seen in the office in November 2017. His initial Polysomnogram indicated an AHI of 51 with severe desaturations. He had been on Auto CPAP 10-20 CM H20. CNOX indicated persistent 02 desaturations and 02 bleed in at 2 LPM was recommended. He underwent a dedicated in lab titration study on 4/26/2016 which indicates incomplete titration to CPAP. He was switched to Bipap. He did best on a Bilevel pressure of 18/13 CM with a resultant AHI of 3.5. He did require 02 bleed in at 2 LPM. His compliance card download indicated an AHI of 7.6 with an average use of 6-7 hours at night. Overnight oximetry 11/12/2016 on Bipap with 2 LPM 02 bleed in, indicated a mean 02 saturation of 89.4% with 168 minutes spent with saturations less than 88%. He felt his Bipap pressures were too high. He was waking bloated and was experiencing increased belching in the morning. His pressure was decreased to 16/11 CM and his 02 bleed in was increased to 3 LPM. Repeat oximetry 12/31/2016 indicates a mean 02 saturation of 89.4% with 153 minutes with saturations less than 88%. He was recommended increasing his 02 bleed in to 4 LPM at night at his last office visit. Compliance card download today in the office indicates an AHI of 8.8 with an average use of just under 6 hours at night. His chip indicates evidence of a mask leak. Compliance download at his last office visit indicates an AHI of 4.8. He is tolerating the pressure well. He has nasal pillows which he feels are a good fit. He notes occasional leaks, but states he does feel he is able to adjust it to avoid leaks. He is unsure of whether or not he sleeps better on therapy. He does feel he wakes refreshed in the morning. He is still occasionally tired during the day, but feels this may be related to  "other health issues and medications. He denies morning headaches.     He does have a history of Atrial fibrillation and is followed by Cardiology.       Past Medical History:   Diagnosis Date   • A-fib ([facility]) 3-23-17   • Arrhythmia     ATRIAL FLUTTER   • Arthritis 3-23-17    \"Everywhere\"   • Atrial flutter (HCC) 1/4/2012    S/p ablation    • Cancer (HCC) 2007    PROSTATE TREATED WITH RADIATION    • Cancer (HCC) 2015    SKIN TREATED WITH MOHS   • Cataract     IOL OU   • Heart burn 3-23-17    Takes PEPCID   • High cholesterol 3-23-17    Controlled with medication   • Lipids abnormal 1/4/2012   • Obstructive sleep apnea 3-23-17    CPAP USE &  Oxygen concentrator 4L nightly   • Pain     right shoulder   • Pain 11/11/15    left knee right hip   • Pain 3-23-17    \"Left Hip & Right Shoulder\"   • Post-nasal drip 3-23-17   • Rotator cuff arthropathy    • Unspecified disorder of thyroid     LOW THYROID   • Urinary bladder disorder 3-23-17    Takes Sanctura   • Urinary incontinence 3-23-17       Past Surgical History:   Procedure Laterality Date   • HIP ARTH ANTERIOR TOTAL Left 4/6/2017    Procedure: HIP ARTHROPLASTY ANTERIOR TOTAL;  Surgeon: Froylan Gunter M.D.;  Location: [facility];  Service:    • KNEE ARTHROPLASTY TOTAL Left 11/24/2015    Procedure: KNEE ARTHROPLASTY TOTAL;  Surgeon: Clovis Cabral M.D.;  Location: [facility];  Service:    • SHOULDER ARTHROSCOPY W/ ROTATOR CUFF REPAIR  5/29/2013    Performed by Clovis Cabral M.D. at [facility]   • SHOULDER DECOMPRESSION ARTHROSCOPIC  5/29/2013    Performed by Clovis Cabral M.D. at [facility]   • LUMBAR LAMINECTOMY DISKECTOMY  4/14/2011    Performed by LUIS GREGORIO at [facility]   • LUMBAR DECOMPRESSION  4/14/2011    Performed by LUIS GREGORIO at [facility]   • RECOVERY  1/6/2011    Performed by SURGERY, [facility]-REC[facility] at SURGERY SAME DAY [facility]   • RECOVERY  12/9/2010    Performed by " SURGERY, CATH-RECOVERY at SURGERY SAME DAY [facility]   • LUMBAR DECOMPRESSION  1996    L2-4 FUSION   • INGUINAL HERNIA REPAIR  1965    LEFT   • APPENDECTOMY  1952   • TONSILLECTOMY  1947   • CATARACT EXTRACTION WITH IOL      OU   • OTHER CARDIAC SURGERY      ablation       Family History   Problem Relation Age of Onset   • Heart Attack Father 79   • Other Mother         Multiple myeloma   • Diabetes Unknown    • Cancer Unknown    • Heart Disease Unknown        Social History     Social History   • Marital status:      Spouse name: N/A   • Number of children: N/A   • Years of education: N/A     Occupational History   • Not on file.     Social History Main Topics   • Smoking status: Never Smoker   • Smokeless tobacco: Never Used   • Alcohol use No   • Drug use: No   • Sexual activity: Not on file     Other Topics Concern   • Not on file     Social History Narrative   • No narrative on file       ROS:  Constitutional: Denies fevers, chills, sweats, weight loss  Eyes: Denies vision loss, pain, drainage, double vision. Wears glasses   Ears/Nose/Mouth/Throat: Denies rhinitis, nasal congestion, ear ache, difficulty hearing, sore throat, persistent hoarseness, decayed teeth/toothache  Cardiovascular: Denies chest pain, tightness, palpitations, swelling in feet/legs, fainting, difficulty breathing when laying down  Respiratory: Denies shortness of breath, cough, sputum, wheezing, painful breathing, coughing up blood  GI: Denies heartburn, difficulty swallowing, nausea, vomiting, abdominal pain, diarrhea, constipation  : Denies frequent urination, painful urination  Integumentary: Denies rashes, lumps or color changes  MSK: Denies painful joints, sore muscles, and back pain.   Neurological: Denies frequent headaches, dizziness, weakness  Sleep: See HPI       Current Outpatient Prescriptions   Medication Sig Dispense Refill   • acetaminophen (TYLENOL) 500 MG Tab Take 500-1,000 mg by mouth Pre-Op Once.     •  "celecoxib (CELEBREX) 200 MG Cap Take 200 mg by mouth Pre-Op Once.     • levothyroxine (SYNTHROID) 100 MCG Tab Take 100 mcg by mouth Every morning on an empty stomach.     • testosterone cypionate (DEPO-TESTOSTERONE) 200 MG/ML Solution injection 100 mg by Intramuscular route every 14 days.     • rivaroxaban (XARELTO) 15 MG Tab tablet Take 15 mg by mouth every bedtime. Atrial Fib     • Ipratropium Bromide (ATROVENT NA) Spray 2 Sprays in nose 2 Times a Day.     • famotidine (PEPCID) 40 MG Tab Take 40 mg by mouth every evening.     • tamsulosin (FLOMAX) 0.4 MG capsule Take 0.4 mg by mouth every evening.     • Cholecalciferol (VITAMIN D3) 5000 UNITS Tab Take 1 Tab by mouth every day.     • Calcium Carbonate (CALCIUM 500 PO) Take 2 Tabs by mouth every day.     • atorvastatin (LIPITOR) 40 MG TABS Take 40 mg by mouth every evening.     • trospium (SANCTURA) 20 MG TABS Take 20 mg by mouth 2 Times a Day.     • Multiple Vitamin (MULTI-VITAMIN PO) Take 1 Tab by mouth every evening.       No current facility-administered medications for this visit.        No Known Allergies    Blood pressure 138/68, pulse 69, temperature 36.1 °C (97 °F), temperature source Temporal, resp. rate 16, height 1.778 m (5' 10\"), weight 88.5 kg (195 lb), SpO2 93 %.    PE:   Appearance: Well developed, well nourished, no acute distress  Eyes: PERRL, EOM intact, sclera white, conjunctiva moist  Ears: no lesions or deformities  Hearing: grossly intact  Nose: no lesions or deformities  Oropharynx: tongue normal, posterior pharynx without erythema or exudate  Mallampati Classification: Class 4   Neck: supple, trachea midline, no masses   Respiratory effort: no intercostal retractions or use of accessory muscles  Lung auscultation: no rales, rhonchi or wheezes  Heart auscultation: no murmur rub or gallop  Extremities: no cyanosis or edema  Abdomen: soft ,non tender, no masses  Gait and Station: normal  Digits and nails: no clubbing, cyanosis, petechiae or " nodes.  Cranial nerves: grossly intact  Skin: no rashes, lesions or ulcers noted  Orientation: Oriented to time, person and place  Mood and affect: mood and affect appropriate, normal interaction with examiner  Judgement: Intact          Assessment:     1. Obstructive sleep apnea  DME Mask and Supplies   2. Hypoxemia           Plan:    1) Continue Bipap @ 16/11 CM H20 with 4 LPM 02 bleed in. Fit for new nasal mask today in the office. RX for new mask and supplies sent to his DME. He does have some persistent fatigue. We discussed an in lab dedicated titration study. He would like to hold off at this time. He feels his fatigue may be related to other health conditions and medications.   2) Sleep hygiene discussed.   3) Continue to follow with Cardiology.  4) Up to date on Influenza vaccination.   5) Follow up in 6 months with compliance card download, sooner if needed.

## 2019-01-25 ENCOUNTER — HOSPITAL ENCOUNTER (OUTPATIENT)
Dept: CARDIOLOGY | Facility: MEDICAL CENTER | Age: 84
End: 2019-01-25
Attending: INTERNAL MEDICINE
Payer: MEDICARE

## 2019-01-25 DIAGNOSIS — I48.19 PERSISTENT ATRIAL FIBRILLATION (HCC): ICD-10-CM

## 2019-01-25 DIAGNOSIS — I10 ESSENTIAL HYPERTENSION: ICD-10-CM

## 2019-01-25 LAB
LV EJECT FRACT MOD 2C 99903: 51.85
LV EJECT FRACT MOD 4C 99902: 57.59
LV EJECT FRACT MOD BP 99901: 55.18

## 2019-01-25 PROCEDURE — 93306 TTE W/DOPPLER COMPLETE: CPT

## 2019-05-19 ENCOUNTER — OFFICE VISIT (OUTPATIENT)
Dept: URGENT CARE | Facility: CLINIC | Age: 84
End: 2019-05-19
Payer: MEDICARE

## 2019-05-19 VITALS
WEIGHT: 190 LBS | RESPIRATION RATE: 20 BRPM | BODY MASS INDEX: 27.2 KG/M2 | DIASTOLIC BLOOD PRESSURE: 80 MMHG | HEART RATE: 72 BPM | TEMPERATURE: 99.7 F | SYSTOLIC BLOOD PRESSURE: 122 MMHG | HEIGHT: 70 IN | OXYGEN SATURATION: 93 %

## 2019-05-19 DIAGNOSIS — H10.33 ACUTE CONJUNCTIVITIS OF BOTH EYES, UNSPECIFIED ACUTE CONJUNCTIVITIS TYPE: ICD-10-CM

## 2019-05-19 DIAGNOSIS — R05.9 COUGH: ICD-10-CM

## 2019-05-19 PROCEDURE — 99203 OFFICE O/P NEW LOW 30 MIN: CPT | Performed by: FAMILY MEDICINE

## 2019-05-19 RX ORDER — BENZONATATE 100 MG/1
100 CAPSULE ORAL 3 TIMES DAILY PRN
Qty: 30 CAP | Refills: 0 | Status: SHIPPED | OUTPATIENT
Start: 2019-05-19 | End: 2019-10-29

## 2019-05-19 RX ORDER — POLYMYXIN B SULFATE AND TRIMETHOPRIM 1; 10000 MG/ML; [USP'U]/ML
1 SOLUTION OPHTHALMIC 4 TIMES DAILY
Qty: 1 BOTTLE | Refills: 0 | Status: SHIPPED | OUTPATIENT
Start: 2019-05-19 | End: 2019-05-26

## 2019-05-19 RX ORDER — DOXYCYCLINE HYCLATE 100 MG
100 TABLET ORAL 2 TIMES DAILY
Qty: 20 TAB | Refills: 0 | Status: SHIPPED | OUTPATIENT
Start: 2019-05-19 | End: 2019-05-29

## 2019-05-19 ASSESSMENT — ENCOUNTER SYMPTOMS
HEADACHES: 1
SORE THROAT: 1
MYALGIAS: 0
BLURRED VISION: 0

## 2019-05-20 NOTE — PROGRESS NOTES
"Subjective:      Gumaro Garsia is a 88 y.o. male who presents with Cough (x5days, cough, chest congestion, eyes are goopy and crusted, redness, nasal congestion)            5d productive cough without blood in sputum. No fever/chills. +wheezing and mild SOB. No PMH asthma/copd  +PMH pneumonia treated as outpatient. No relief with home remedies. Cough is severe at night.     PMH afib          Review of Systems   Constitutional: Negative for malaise/fatigue and weight loss.   HENT: Positive for congestion and sore throat (initial now resolved). Negative for ear discharge and ear pain.    Eyes: Positive for discharge and redness. Negative for blurred vision.   Cardiovascular: Negative for leg swelling.   Musculoskeletal: Negative for myalgias.   Skin: Negative for itching and rash.   Neurological: Positive for headaches (intermittent with cough).          Objective:     /80 (BP Location: Left arm, Patient Position: Sitting, BP Cuff Size: Adult)   Pulse 72   Temp 37.6 °C (99.7 °F) (Temporal)   Resp 20   Ht 1.778 m (5' 10\")   Wt 86.2 kg (190 lb)   SpO2 93%   BMI 27.26 kg/m²      Physical Exam   Constitutional: He is oriented to person, place, and time. He appears well-nourished. No distress.   HENT:   Head: Normocephalic and atraumatic.   Nasal congestion  Pharynx red without exudate     Eyes: Pupils are equal, round, and reactive to light. EOM are normal.   B conjunctiva injected with moderate exudate. No foreign body. No gross corneal lesion.     Neck: Neck supple.   Cardiovascular: Normal rate, regular rhythm and normal heart sounds.    No murmur heard.  Pulmonary/Chest: Effort normal and breath sounds normal. No respiratory distress.   Neurological: He is alert and oriented to person, place, and time.   Skin: Skin is warm and dry. No rash noted.               Assessment/Plan:   cxr per rad:  1.  Linear bibasilar atelectasis.    2.  Cardiomegaly.    1. Cough  doxycycline (VIBRAMYCIN) 100 MG Tab    " benzonatate (TESSALON) 100 MG Cap    DX-CHEST-2 VIEWS   2. Acute conjunctivitis of both eyes, unspecified acute conjunctivitis type  polymixin-trimethoprim (POLYTRIM) 83011-7.1 UNIT/ML-% Solution     Differential diagnosis, natural history, supportive care, and indications for immediate follow-up discussed at length.     Contingent antibiotic prescription given to patient to fill upon meeting criteria of guidelines discussed.  T

## 2019-05-21 ENCOUNTER — APPOINTMENT (OUTPATIENT)
Dept: RADIOLOGY | Facility: IMAGING CENTER | Age: 84
End: 2019-05-21
Attending: FAMILY MEDICINE
Payer: MEDICARE

## 2019-05-21 DIAGNOSIS — R05.9 COUGH: ICD-10-CM

## 2019-05-21 PROCEDURE — 71046 X-RAY EXAM CHEST 2 VIEWS: CPT | Mod: 26 | Performed by: PHYSICIAN ASSISTANT

## 2019-05-28 ASSESSMENT — ENCOUNTER SYMPTOMS
EYE DISCHARGE: 1
WEIGHT LOSS: 0
EYE REDNESS: 1

## 2019-06-03 ENCOUNTER — SLEEP CENTER VISIT (OUTPATIENT)
Dept: SLEEP MEDICINE | Facility: MEDICAL CENTER | Age: 84
End: 2019-06-03
Payer: MEDICARE

## 2019-06-03 VITALS
WEIGHT: 186 LBS | SYSTOLIC BLOOD PRESSURE: 130 MMHG | RESPIRATION RATE: 16 BRPM | BODY MASS INDEX: 26.63 KG/M2 | HEART RATE: 71 BPM | OXYGEN SATURATION: 92 % | HEIGHT: 70 IN | DIASTOLIC BLOOD PRESSURE: 70 MMHG

## 2019-06-03 DIAGNOSIS — G47.33 OBSTRUCTIVE SLEEP APNEA: ICD-10-CM

## 2019-06-03 DIAGNOSIS — R09.02 HYPOXEMIA: ICD-10-CM

## 2019-06-03 PROCEDURE — 99213 OFFICE O/P EST LOW 20 MIN: CPT | Performed by: NURSE PRACTITIONER

## 2019-06-03 RX ORDER — METOPROLOL SUCCINATE 100 MG/1
100 TABLET, EXTENDED RELEASE ORAL EVERY EVENING
COMMUNITY

## 2019-06-03 NOTE — PATIENT INSTRUCTIONS
Plan:    1) Continue Bipap 16/11 CM H20 with 4 LPM 02 bleed in. He feels his new mask is a better fit. AHI is reduced to 6.2.   2) Sleep hygiene discussed.   3) Continue to follow with Cardiology.  4) Recommend updated Influenza vaccination in the Fall.   5) Follow up in 6 months, sooner OV if needed.

## 2019-06-03 NOTE — PROGRESS NOTES
Chief Complaint   Patient presents with   • Apnea     Last Seen 12/24/18       HPI:  Gumaro Garsia is a 88 y.o. year old male here today for follow-up on his obstructive sleep apnea. His initial Polysomnogram indicated an AHI of 51 with severe desaturations. He had been on Auto CPAP 10-20 CM H20. CNOX indicated persistent 02 desaturations and 02 bleed in at 2 LPM was recommended. He underwent a dedicated in lab titration study on 4/26/2016 which indicates incomplete titration to CPAP. He was switched to Bipap. He did best on a Bilevel pressure of 18/13 CM with a resultant AHI of 3.5. He did require 02 bleed in at 2 LPM. His compliance card download indicated an AHI of 7.6 with an average use of 6-7 hours at night. Overnight oximetry 11/12/2016 on Bipap with 2 LPM 02 bleed in, indicated a mean 02 saturation of 89.4% with 168 minutes spent with saturations less than 88%. He felt his Bipap pressures were too high. He was waking bloated and was experiencing increased belching in the morning. His pressure was decreased to 16/11 CM and his 02 bleed in was increased to 3 LPM. Repeat oximetry 12/31/2016 indicates a mean 02 saturation of 89.4% with 153 minutes with saturations less than 88%. He was recommended increasing his 02 bleed in to 4 LPM at night. Repeat compliance card download at his last office visit indicated an AHI of 8.8 with an average use of just under 6 hours at night. He did have evidence of a mask leak. He fit for a new nasal mask at his last office visit and ordered updated supplies. Repeat compliance card download today in the office indicates an AHI of 6.2 with an average use of 7 hours at night. No evidence of mask leak.   He has a nasal mask which he feels is a good fit. He tolerates the pressure well. He is unsure of whether or not he sleeps better on therapy. He tends to wake refreshed. However will occasionally tire in the afternoon. He denies any morning headaches.      He does have a  "history of Atrial fibrillation and is followed by Cardiology.              Past Medical History:   Diagnosis Date   • A-fib (HCC) 3-23-17   • Arrhythmia     ATRIAL FLUTTER   • Arthritis 3-23-17    \"Everywhere\"   • Atrial flutter (HCC) 1/4/2012    S/p ablation    • Cancer (HCC) 2007    PROSTATE TREATED WITH RADIATION    • Cancer (HCC) 2015    SKIN TREATED WITH MOHS   • Cataract     IOL OU   • Heart burn 3-23-17    Takes PEPCID   • High cholesterol 3-23-17    Controlled with medication   • Lipids abnormal 1/4/2012   • Obstructive sleep apnea 3-23-17    CPAP USE &  Oxygen concentrator 4L nightly   • Pain     right shoulder   • Pain 11/11/15    left knee right hip   • Pain 3-23-17    \"Left Hip & Right Shoulder\"   • Post-nasal drip 3-23-17   • Rotator cuff arthropathy    • Unspecified disorder of thyroid     LOW THYROID   • Urinary bladder disorder 3-23-17    Takes [place]   • Urinary incontinence 3-23-17       Past Surgical History:   Procedure Laterality Date   • HIP ARTH ANTERIOR TOTAL Left 4/6/2017    Procedure: HIP ARTHROPLASTY ANTERIOR TOTAL;  Surgeon: Froylan Gunter M.D.;  Location: [facility];  Service:    • KNEE ARTHROPLASTY TOTAL Left 11/24/2015    Procedure: KNEE ARTHROPLASTY TOTAL;  Surgeon: Clovis Cabral M.D.;  Location: [facility];  Service:    • SHOULDER ARTHROSCOPY W/ ROTATOR CUFF REPAIR  5/29/2013    Performed by Clovis Cabral M.D. at [facility]   • SHOULDER DECOMPRESSION ARTHROSCOPIC  5/29/2013    Performed by Clovis Cabral M.D. at [facility]   • LUMBAR LAMINECTOMY DISKECTOMY  4/14/2011    Performed by LUIS GREGORIO at SURGERY [facility]   • LUMBAR DECOMPRESSION  4/14/2011    Performed by LUIS GREGORIO at [facility]   • RECOVERY  1/6/2011    Performed by SURGERY, CATH-RECOVERY at SURGERY SAME DAY [facility]   • RECOVERY  12/9/2010    Performed by SURGERY, CATH-RECOVERY at SURGERY SAME DAY [facility]   • LUMBAR " DECOMPRESSION  1996    L2-4 FUSION   • INGUINAL HERNIA REPAIR  1965    LEFT   • APPENDECTOMY  1952   • TONSILLECTOMY  1947   • CATARACT EXTRACTION WITH IOL      OU   • OTHER CARDIAC SURGERY      ablation       Family History   Problem Relation Age of Onset   • Heart Attack Father 79   • Other Mother         Multiple myeloma   • Diabetes Unknown    • Cancer Unknown    • Heart Disease Unknown        Social History     Social History   • Marital status:      Spouse name: N/A   • Number of children: N/A   • Years of education: N/A     Occupational History   • Not on file.     Social History Main Topics   • Smoking status: Never Smoker   • Smokeless tobacco: Never Used   • Alcohol use No   • Drug use: No   • Sexual activity: Not on file     Other Topics Concern   • Not on file     Social History Narrative   • No narrative on file     ROS:  Constitutional: Denies fevers, chills, sweats, weight loss  Eyes: Denies vision loss, pain, drainage, double vision. Wears glasses   Ears/Nose/Mouth/Throat: Denies rhinitis, nasal congestion, ear ache, difficulty hearing, sore throat, persistent hoarseness, decayed teeth/toothache  Cardiovascular: Denies chest pain, tightness, palpitations, swelling in feet/legs, fainting, difficulty breathing when laying down  Respiratory: Denies shortness of breath, cough, sputum, wheezing, painful breathing, coughing up blood  GI: Denies heartburn, difficulty swallowing, nausea, vomiting, abdominal pain, diarrhea, constipation  : Denies frequent urination, painful urination  Integumentary: Denies rashes, lumps or color changes  MSK: Denies painful joints, sore muscles, and back pain.   Neurological: Denies frequent headaches, dizziness, weakness  Sleep: See HPI       Current Outpatient Prescriptions   Medication Sig Dispense Refill   • metoprolol SR (TOPROL XL) 100 MG TABLET SR 24 HR Take 100 mg by mouth every day.     • acetaminophen (TYLENOL) 500 MG Tab Take 500-1,000 mg by mouth Pre-Op  "Once.     • celecoxib (CELEBREX) 200 MG Cap Take 200 mg by mouth Pre-Op Once.     • levothyroxine (SYNTHROID) 100 MCG Tab Take 100 mcg by mouth Every morning on an empty stomach.     • testosterone cypionate (DEPO-TESTOSTERONE) 200 MG/ML Solution injection 100 mg by Intramuscular route every 14 days.     • rivaroxaban (XARELTO) 15 MG Tab tablet Take 15 mg by mouth every bedtime. Atrial Fib     • Ipratropium Bromide (ATROVENT NA) Spray 2 Sprays in nose 2 Times a Day.     • famotidine (PEPCID) 40 MG Tab Take 40 mg by mouth every evening.     • tamsulosin (FLOMAX) 0.4 MG capsule Take 0.4 mg by mouth every evening.     • Cholecalciferol (VITAMIN D3) 5000 UNITS Tab Take 1 Tab by mouth every day.     • Calcium Carbonate (CALCIUM 500 PO) Take 2 Tabs by mouth every day.     • atorvastatin (LIPITOR) 40 MG TABS Take 40 mg by mouth every evening.     • trospium (SANCTURA) 20 MG TABS Take 20 mg by mouth 2 Times a Day.     • Multiple Vitamin (MULTI-VITAMIN PO) Take 1 Tab by mouth every evening.     • benzonatate (TESSALON) 100 MG Cap Take 1 Cap by mouth 3 times a day as needed for Cough. (Patient not taking: Reported on 6/3/2019) 30 Cap 0     No current facility-administered medications for this visit.        No Known Allergies    /70 (BP Location: Left arm, Patient Position: Sitting, BP Cuff Size: Adult)   Pulse 71   Resp 16   Ht 1.778 m (5' 10\")   Wt 84.4 kg (186 lb)   SpO2 92%     PE:   Appearance: Well developed, well nourished, no acute distress  Eyes: PERRL, EOM intact, sclera white, conjunctiva moist  Ears: no lesions or deformities  Hearing: grossly intact  Nose: no lesions or deformities  Oropharynx: tongue normal, posterior pharynx without erythema or exudate  Mallampati Classification: Class 4  Neck: supple, trachea midline, no masses   Respiratory effort: no intercostal retractions or use of accessory muscles  Lung auscultation: no rales, rhonchi or wheezes  Heart auscultation: murmur noted  Extremities: " no cyanosis or edema  Abdomen: soft ,non tender, no masses  Gait and Station: normal  Digits and nails: no clubbing, cyanosis, petechiae or nodes.  Cranial nerves: grossly intact  Skin: no rashes, lesions or ulcers noted  Orientation: Oriented to time, person and place  Mood and affect: mood and affect appropriate, normal interaction with examiner  Judgement: Intact          Assessment:    1. Obstructive sleep apnea     2. Hypoxemia           Plan:    1) Continue Bipap 16/11 CM H20 with 4 LPM 02 bleed in. He feels his new mask is a better fit. AHI is reduced to 6.2.   2) Sleep hygiene discussed.   3) Continue to follow with Cardiology.  4) Recommend updated Influenza vaccination in the Fall.   5) Follow up in 6 months, sooner OV if needed.

## 2019-10-29 DIAGNOSIS — Z01.812 PRE-OPERATIVE LABORATORY EXAMINATION: ICD-10-CM

## 2019-10-29 DIAGNOSIS — Z01.810 PRE-OPERATIVE CARDIOVASCULAR EXAMINATION: ICD-10-CM

## 2019-10-29 LAB
ANION GAP SERPL CALC-SCNC: 7 MMOL/L (ref 0–11.9)
BUN SERPL-MCNC: 22 MG/DL (ref 8–22)
CALCIUM SERPL-MCNC: 9 MG/DL (ref 8.5–10.5)
CHLORIDE SERPL-SCNC: 106 MMOL/L (ref 96–112)
CO2 SERPL-SCNC: 27 MMOL/L (ref 20–33)
CREAT SERPL-MCNC: 0.94 MG/DL (ref 0.5–1.4)
EKG IMPRESSION: NORMAL
ERYTHROCYTE [DISTWIDTH] IN BLOOD BY AUTOMATED COUNT: 46.5 FL (ref 35.9–50)
GLUCOSE SERPL-MCNC: 85 MG/DL (ref 65–99)
HCT VFR BLD AUTO: 46.6 % (ref 42–52)
HGB BLD-MCNC: 16 G/DL (ref 14–18)
MCH RBC QN AUTO: 31.4 PG (ref 27–33)
MCHC RBC AUTO-ENTMCNC: 34.3 G/DL (ref 33.7–35.3)
MCV RBC AUTO: 91.6 FL (ref 81.4–97.8)
PLATELET # BLD AUTO: 133 K/UL (ref 164–446)
PMV BLD AUTO: 12.4 FL (ref 9–12.9)
POTASSIUM SERPL-SCNC: 3.8 MMOL/L (ref 3.6–5.5)
RBC # BLD AUTO: 5.09 M/UL (ref 4.7–6.1)
SODIUM SERPL-SCNC: 140 MMOL/L (ref 135–145)
WBC # BLD AUTO: 3.8 K/UL (ref 4.8–10.8)

## 2019-10-29 PROCEDURE — 93005 ELECTROCARDIOGRAM TRACING: CPT | Performed by: ORTHOPAEDIC SURGERY

## 2019-10-29 PROCEDURE — 87640 STAPH A DNA AMP PROBE: CPT

## 2019-10-29 PROCEDURE — 36415 COLL VENOUS BLD VENIPUNCTURE: CPT

## 2019-10-29 PROCEDURE — 93010 ELECTROCARDIOGRAM REPORT: CPT | Performed by: INTERNAL MEDICINE

## 2019-10-29 PROCEDURE — 85027 COMPLETE CBC AUTOMATED: CPT

## 2019-10-29 PROCEDURE — 87641 MR-STAPH DNA AMP PROBE: CPT

## 2019-10-29 PROCEDURE — 80048 BASIC METABOLIC PNL TOTAL CA: CPT

## 2019-10-29 RX ORDER — LIOTHYRONINE SODIUM 5 UG/1
5 TABLET ORAL DAILY
COMMUNITY

## 2019-10-29 RX ORDER — LORATADINE 10 MG/1
10 TABLET ORAL DAILY
COMMUNITY

## 2019-10-29 RX ORDER — CETIRIZINE HYDROCHLORIDE 10 MG/1
10 TABLET ORAL DAILY
COMMUNITY

## 2019-10-29 NOTE — OR NURSING
DISCHARGE PLANNING NOTE - TOTAL JOINT    Procedure: Procedure(s):  ARTHROPLASTY, KNEE, TOTAL  Procedure Date: 11/8/2019  Insurance:    Equipment currently available at home? Walker, raised toilet seat, long handle shower head, grabber  Steps into the home? two  Steps within the home? one  Toilet height? standard  Type of shower? Walk in   Who will be with you during your recovery? son  Is Outpatient Physical Therapy set up after surgery? No   Did you take the Total Joint Class and where? No   Planning same day discharge?No     Plan: plans to spend the noc but ok if he goes home the same day.  Pt has appt with Dr. Cabral tomorrow to discuss outpt therapy.  Has son to help after surgery

## 2019-10-29 NOTE — OR NURSING
"Preadmit appointment: \" Preparing for your Procedure information\" sheet given to patient with verbal and written instructions. Patient instructed to continue prescribed medications through the day before surgery, instructed to take the following medications the day of surgery per anesthesia protocol: Levothyroxine, Liothyronine.  Pt denies issues with anesthesia. Pt denies any burning, urgency, frequency, pain or odor with urination.  Spoke to Ladan at Dr. Cabral office, she states she has a PCP clearance and will fax over [phone] and she will ask Dr. Cabral tomorrow at pts preop visit regarding Xarelto instructions preop and notified her pt's cardiologist is Dr. LEYLA Unger @ [place].   "

## 2019-10-29 NOTE — OR NURSING
"TOTAL JOINT REPLACEMENT SURGERY   PreAdmit Appointment  Pre-Operative Education Note       1) Did you take a Total Joint Replacement Pre-Operative Education class?  Where?   Answer: No    2) If you did not take a class, did you receive pre-op education in the form of a book or through an online class?    Answer: yes     3) Have you had the same joint replacement procedure within the last 3 years? no  Answer: done in 11/2015    For patients who answered \"No\" to the above questions:     4) Was patient given information on [facility] Pre-Op Education class through the Pre-Op Education Class flyer or the Alternative Pre-op Education flyer?   Answer: yes    5) Was a [facility] Total Joint Replacement Patient Guide binder handed out?   Answer: yes    6) Did the patient refuse preoperative education?  Answer:  no  "

## 2019-10-30 LAB
SCCMEC + MECA PNL NOSE NAA+PROBE: NEGATIVE
SCCMEC + MECA PNL NOSE NAA+PROBE: NEGATIVE

## 2019-11-07 ENCOUNTER — ANESTHESIA EVENT (OUTPATIENT)
Dept: SURGERY | Facility: MEDICAL CENTER | Age: 84
End: 2019-11-07
Payer: MEDICARE

## 2019-11-08 ENCOUNTER — APPOINTMENT (OUTPATIENT)
Dept: RADIOLOGY | Facility: MEDICAL CENTER | Age: 84
End: 2019-11-08
Attending: ORTHOPAEDIC SURGERY
Payer: MEDICARE

## 2019-11-08 ENCOUNTER — HOSPITAL ENCOUNTER (OUTPATIENT)
Facility: MEDICAL CENTER | Age: 84
End: 2019-11-09
Attending: ORTHOPAEDIC SURGERY | Admitting: ORTHOPAEDIC SURGERY
Payer: MEDICARE

## 2019-11-08 ENCOUNTER — ANESTHESIA (OUTPATIENT)
Dept: SURGERY | Facility: MEDICAL CENTER | Age: 84
End: 2019-11-08
Payer: MEDICARE

## 2019-11-08 PROCEDURE — 700102 HCHG RX REV CODE 250 W/ 637 OVERRIDE(OP): Performed by: ANESTHESIOLOGY

## 2019-11-08 PROCEDURE — 700111 HCHG RX REV CODE 636 W/ 250 OVERRIDE (IP): Performed by: ORTHOPAEDIC SURGERY

## 2019-11-08 PROCEDURE — 700101 HCHG RX REV CODE 250: Performed by: ANESTHESIOLOGY

## 2019-11-08 PROCEDURE — 700101 HCHG RX REV CODE 250: Performed by: ORTHOPAEDIC SURGERY

## 2019-11-08 PROCEDURE — 160048 HCHG OR STATISTICAL LEVEL 1-5: Performed by: ORTHOPAEDIC SURGERY

## 2019-11-08 PROCEDURE — 160029 HCHG SURGERY MINUTES - 1ST 30 MINS LEVEL 4: Performed by: ORTHOPAEDIC SURGERY

## 2019-11-08 PROCEDURE — 96365 THER/PROPH/DIAG IV INF INIT: CPT

## 2019-11-08 PROCEDURE — 94660 CPAP INITIATION&MGMT: CPT

## 2019-11-08 PROCEDURE — A9270 NON-COVERED ITEM OR SERVICE: HCPCS | Performed by: ANESTHESIOLOGY

## 2019-11-08 PROCEDURE — 700105 HCHG RX REV CODE 258: Performed by: ORTHOPAEDIC SURGERY

## 2019-11-08 PROCEDURE — L8699 PROSTHETIC IMPLANT NOS: HCPCS | Performed by: ORTHOPAEDIC SURGERY

## 2019-11-08 PROCEDURE — A9270 NON-COVERED ITEM OR SERVICE: HCPCS | Performed by: ORTHOPAEDIC SURGERY

## 2019-11-08 PROCEDURE — 97535 SELF CARE MNGMENT TRAINING: CPT

## 2019-11-08 PROCEDURE — C1713 ANCHOR/SCREW BN/BN,TIS/BN: HCPCS | Performed by: ORTHOPAEDIC SURGERY

## 2019-11-08 PROCEDURE — 700111 HCHG RX REV CODE 636 W/ 250 OVERRIDE (IP): Performed by: ANESTHESIOLOGY

## 2019-11-08 PROCEDURE — 502579 HCHG PACK, TOTAL KNEE: Performed by: ORTHOPAEDIC SURGERY

## 2019-11-08 PROCEDURE — 73560 X-RAY EXAM OF KNEE 1 OR 2: CPT | Mod: RT

## 2019-11-08 PROCEDURE — 502000 HCHG MISC OR IMPLANTS RC 0278: Performed by: ORTHOPAEDIC SURGERY

## 2019-11-08 PROCEDURE — 160022 HCHG BLOCK: Performed by: ORTHOPAEDIC SURGERY

## 2019-11-08 PROCEDURE — G0378 HOSPITAL OBSERVATION PER HR: HCPCS

## 2019-11-08 PROCEDURE — 160002 HCHG RECOVERY MINUTES (STAT): Performed by: ORTHOPAEDIC SURGERY

## 2019-11-08 PROCEDURE — 501838 HCHG SUTURE GENERAL: Performed by: ORTHOPAEDIC SURGERY

## 2019-11-08 PROCEDURE — 700102 HCHG RX REV CODE 250 W/ 637 OVERRIDE(OP): Performed by: ORTHOPAEDIC SURGERY

## 2019-11-08 PROCEDURE — 160035 HCHG PACU - 1ST 60 MINS PHASE I: Performed by: ORTHOPAEDIC SURGERY

## 2019-11-08 PROCEDURE — 160009 HCHG ANES TIME/MIN: Performed by: ORTHOPAEDIC SURGERY

## 2019-11-08 PROCEDURE — 160036 HCHG PACU - EA ADDL 30 MINS PHASE I: Performed by: ORTHOPAEDIC SURGERY

## 2019-11-08 PROCEDURE — 160041 HCHG SURGERY MINUTES - EA ADDL 1 MIN LEVEL 4: Performed by: ORTHOPAEDIC SURGERY

## 2019-11-08 PROCEDURE — 502240 HCHG MISC OR SUPPLY RC 0272: Performed by: ORTHOPAEDIC SURGERY

## 2019-11-08 DEVICE — IMPLANTABLE DEVICE: Type: IMPLANTABLE DEVICE | Site: KNEE | Status: FUNCTIONAL

## 2019-11-08 DEVICE — BONE CEMENT SIMPLEX ANTIBIO - (10/PK): Type: IMPLANTABLE DEVICE | Site: KNEE | Status: FUNCTIONAL

## 2019-11-08 DEVICE — IMPLANT GII OVAL RESURFACING PAT 32MM (1EA): Type: IMPLANTABLE DEVICE | Site: KNEE | Status: FUNCTIONAL

## 2019-11-08 DEVICE — FEMUR TKA OXINIUM CR JOURNEY II NP RIGHT SIZE 5 (1EA): Type: IMPLANTABLE DEVICE | Site: KNEE | Status: FUNCTIONAL

## 2019-11-08 DEVICE — BASE TIBIAL NONPOROUS JOURNEY II RIGHT SIZE 5 (1EA): Type: IMPLANTABLE DEVICE | Site: KNEE | Status: FUNCTIONAL

## 2019-11-08 DEVICE — SUTURE ANCHOR 2.8MM: Type: IMPLANTABLE DEVICE | Site: KNEE | Status: FUNCTIONAL

## 2019-11-08 RX ORDER — MAGNESIUM SULFATE HEPTAHYDRATE 500 MG/ML
INJECTION, SOLUTION INTRAMUSCULAR; INTRAVENOUS PRN
Status: DISCONTINUED | OUTPATIENT
Start: 2019-11-08 | End: 2019-11-08 | Stop reason: SURG

## 2019-11-08 RX ORDER — DIPHENHYDRAMINE HYDROCHLORIDE 50 MG/ML
6.25 INJECTION INTRAMUSCULAR; INTRAVENOUS
Status: DISCONTINUED | OUTPATIENT
Start: 2019-11-08 | End: 2019-11-08 | Stop reason: HOSPADM

## 2019-11-08 RX ORDER — CETIRIZINE HYDROCHLORIDE 10 MG/1
10 TABLET ORAL DAILY
Status: DISCONTINUED | OUTPATIENT
Start: 2019-11-08 | End: 2019-11-09 | Stop reason: HOSPADM

## 2019-11-08 RX ORDER — DIPHENHYDRAMINE HYDROCHLORIDE 50 MG/ML
25 INJECTION INTRAMUSCULAR; INTRAVENOUS EVERY 6 HOURS PRN
Status: DISCONTINUED | OUTPATIENT
Start: 2019-11-08 | End: 2019-11-09 | Stop reason: HOSPADM

## 2019-11-08 RX ORDER — CEFAZOLIN SODIUM 1 G/3ML
INJECTION, POWDER, FOR SOLUTION INTRAMUSCULAR; INTRAVENOUS PRN
Status: DISCONTINUED | OUTPATIENT
Start: 2019-11-08 | End: 2019-11-08 | Stop reason: SURG

## 2019-11-08 RX ORDER — TAMSULOSIN HYDROCHLORIDE 0.4 MG/1
0.4 CAPSULE ORAL EVERY EVENING
Status: DISCONTINUED | OUTPATIENT
Start: 2019-11-08 | End: 2019-11-09 | Stop reason: HOSPADM

## 2019-11-08 RX ORDER — HALOPERIDOL 5 MG/ML
1 INJECTION INTRAMUSCULAR
Status: DISCONTINUED | OUTPATIENT
Start: 2019-11-08 | End: 2019-11-08 | Stop reason: HOSPADM

## 2019-11-08 RX ORDER — DEXAMETHASONE SODIUM PHOSPHATE 4 MG/ML
4 INJECTION, SOLUTION INTRA-ARTICULAR; INTRALESIONAL; INTRAMUSCULAR; INTRAVENOUS; SOFT TISSUE
Status: DISCONTINUED | OUTPATIENT
Start: 2019-11-08 | End: 2019-11-09 | Stop reason: HOSPADM

## 2019-11-08 RX ORDER — TROSPIUM CHLORIDE 20 MG/1
20 TABLET, FILM COATED ORAL 2 TIMES DAILY
Status: DISCONTINUED | OUTPATIENT
Start: 2019-11-08 | End: 2019-11-08

## 2019-11-08 RX ORDER — OXYBUTYNIN CHLORIDE 5 MG/1
5 TABLET ORAL 2 TIMES DAILY
Status: DISCONTINUED | OUTPATIENT
Start: 2019-11-08 | End: 2019-11-09 | Stop reason: HOSPADM

## 2019-11-08 RX ORDER — CHLORPROMAZINE HYDROCHLORIDE 25 MG/1
25 TABLET, FILM COATED ORAL EVERY 6 HOURS PRN
Status: DISCONTINUED | OUTPATIENT
Start: 2019-11-08 | End: 2019-11-09 | Stop reason: HOSPADM

## 2019-11-08 RX ORDER — ONDANSETRON 2 MG/ML
INJECTION INTRAMUSCULAR; INTRAVENOUS PRN
Status: DISCONTINUED | OUTPATIENT
Start: 2019-11-08 | End: 2019-11-08 | Stop reason: SURG

## 2019-11-08 RX ORDER — LIOTHYRONINE SODIUM 5 UG/1
5 TABLET ORAL DAILY
Status: DISCONTINUED | OUTPATIENT
Start: 2019-11-09 | End: 2019-11-09 | Stop reason: HOSPADM

## 2019-11-08 RX ORDER — OXYCODONE HCL 5 MG/5 ML
10 SOLUTION, ORAL ORAL
Status: DISCONTINUED | OUTPATIENT
Start: 2019-11-08 | End: 2019-11-08 | Stop reason: HOSPADM

## 2019-11-08 RX ORDER — LIDOCAINE HYDROCHLORIDE 20 MG/ML
INJECTION, SOLUTION EPIDURAL; INFILTRATION; INTRACAUDAL; PERINEURAL PRN
Status: DISCONTINUED | OUTPATIENT
Start: 2019-11-08 | End: 2019-11-08 | Stop reason: SURG

## 2019-11-08 RX ORDER — ONDANSETRON 2 MG/ML
4 INJECTION INTRAMUSCULAR; INTRAVENOUS EVERY 4 HOURS PRN
Status: DISCONTINUED | OUTPATIENT
Start: 2019-11-08 | End: 2019-11-09 | Stop reason: HOSPADM

## 2019-11-08 RX ORDER — CHLORPROMAZINE HYDROCHLORIDE 25 MG/ML
25 INJECTION INTRAMUSCULAR EVERY 6 HOURS PRN
Status: DISCONTINUED | OUTPATIENT
Start: 2019-11-08 | End: 2019-11-09 | Stop reason: HOSPADM

## 2019-11-08 RX ORDER — BUPIVACAINE HYDROCHLORIDE AND EPINEPHRINE 5; 5 MG/ML; UG/ML
INJECTION, SOLUTION EPIDURAL; INTRACAUDAL; PERINEURAL
Status: DISCONTINUED | OUTPATIENT
Start: 2019-11-08 | End: 2019-11-08 | Stop reason: HOSPADM

## 2019-11-08 RX ORDER — CELECOXIB 200 MG/1
200 CAPSULE ORAL ONCE
Status: COMPLETED | OUTPATIENT
Start: 2019-11-08 | End: 2019-11-08

## 2019-11-08 RX ORDER — DOCUSATE SODIUM 100 MG/1
100 CAPSULE, LIQUID FILLED ORAL 2 TIMES DAILY
Status: DISCONTINUED | OUTPATIENT
Start: 2019-11-08 | End: 2019-11-09 | Stop reason: HOSPADM

## 2019-11-08 RX ORDER — DEXTROSE MONOHYDRATE, SODIUM CHLORIDE, AND POTASSIUM CHLORIDE 50; 1.49; 4.5 G/1000ML; G/1000ML; G/1000ML
INJECTION, SOLUTION INTRAVENOUS CONTINUOUS
Status: DISPENSED | OUTPATIENT
Start: 2019-11-08 | End: 2019-11-08

## 2019-11-08 RX ORDER — SCOLOPAMINE TRANSDERMAL SYSTEM 1 MG/1
1 PATCH, EXTENDED RELEASE TRANSDERMAL
Status: DISCONTINUED | OUTPATIENT
Start: 2019-11-08 | End: 2019-11-09 | Stop reason: HOSPADM

## 2019-11-08 RX ORDER — DIPHENHYDRAMINE HCL 25 MG
25 TABLET ORAL EVERY 6 HOURS PRN
Status: DISCONTINUED | OUTPATIENT
Start: 2019-11-08 | End: 2019-11-09 | Stop reason: HOSPADM

## 2019-11-08 RX ORDER — ACETAMINOPHEN 500 MG
1000 TABLET ORAL ONCE
Status: COMPLETED | OUTPATIENT
Start: 2019-11-08 | End: 2019-11-08

## 2019-11-08 RX ORDER — METOPROLOL SUCCINATE 100 MG/1
100 TABLET, EXTENDED RELEASE ORAL EVERY EVENING
Status: DISCONTINUED | OUTPATIENT
Start: 2019-11-08 | End: 2019-11-09 | Stop reason: HOSPADM

## 2019-11-08 RX ORDER — ATORVASTATIN CALCIUM 40 MG/1
40 TABLET, FILM COATED ORAL NIGHTLY
Status: DISCONTINUED | OUTPATIENT
Start: 2019-11-08 | End: 2019-11-09 | Stop reason: HOSPADM

## 2019-11-08 RX ORDER — ENEMA 19; 7 G/133ML; G/133ML
1 ENEMA RECTAL
Status: DISCONTINUED | OUTPATIENT
Start: 2019-11-08 | End: 2019-11-09 | Stop reason: HOSPADM

## 2019-11-08 RX ORDER — SODIUM CHLORIDE, SODIUM LACTATE, POTASSIUM CHLORIDE, CALCIUM CHLORIDE 600; 310; 30; 20 MG/100ML; MG/100ML; MG/100ML; MG/100ML
INJECTION, SOLUTION INTRAVENOUS CONTINUOUS
Status: DISCONTINUED | OUTPATIENT
Start: 2019-11-08 | End: 2019-11-08

## 2019-11-08 RX ORDER — FAMOTIDINE 20 MG/1
40 TABLET, FILM COATED ORAL
Status: DISCONTINUED | OUTPATIENT
Start: 2019-11-08 | End: 2019-11-09 | Stop reason: HOSPADM

## 2019-11-08 RX ORDER — DEXAMETHASONE SODIUM PHOSPHATE 4 MG/ML
INJECTION, SOLUTION INTRA-ARTICULAR; INTRALESIONAL; INTRAMUSCULAR; INTRAVENOUS; SOFT TISSUE PRN
Status: DISCONTINUED | OUTPATIENT
Start: 2019-11-08 | End: 2019-11-08 | Stop reason: SURG

## 2019-11-08 RX ORDER — TRANEXAMIC ACID 100 MG/ML
INJECTION, SOLUTION INTRAVENOUS PRN
Status: DISCONTINUED | OUTPATIENT
Start: 2019-11-08 | End: 2019-11-08 | Stop reason: HOSPADM

## 2019-11-08 RX ORDER — BISACODYL 10 MG
10 SUPPOSITORY, RECTAL RECTAL
Status: DISCONTINUED | OUTPATIENT
Start: 2019-11-08 | End: 2019-11-09 | Stop reason: HOSPADM

## 2019-11-08 RX ORDER — HALOPERIDOL 5 MG/ML
1 INJECTION INTRAMUSCULAR EVERY 6 HOURS PRN
Status: DISCONTINUED | OUTPATIENT
Start: 2019-11-08 | End: 2019-11-09 | Stop reason: HOSPADM

## 2019-11-08 RX ORDER — MEPERIDINE HYDROCHLORIDE 25 MG/ML
12.5 INJECTION INTRAMUSCULAR; INTRAVENOUS; SUBCUTANEOUS
Status: DISCONTINUED | OUTPATIENT
Start: 2019-11-08 | End: 2019-11-08 | Stop reason: HOSPADM

## 2019-11-08 RX ORDER — LEVOTHYROXINE SODIUM 0.1 MG/1
100 TABLET ORAL
Status: DISCONTINUED | OUTPATIENT
Start: 2019-11-09 | End: 2019-11-09 | Stop reason: HOSPADM

## 2019-11-08 RX ORDER — HYDROMORPHONE HYDROCHLORIDE 1 MG/ML
0.2 INJECTION, SOLUTION INTRAMUSCULAR; INTRAVENOUS; SUBCUTANEOUS
Status: DISCONTINUED | OUTPATIENT
Start: 2019-11-08 | End: 2019-11-08 | Stop reason: HOSPADM

## 2019-11-08 RX ORDER — POLYETHYLENE GLYCOL 3350 17 G/17G
1 POWDER, FOR SOLUTION ORAL 2 TIMES DAILY PRN
Status: DISCONTINUED | OUTPATIENT
Start: 2019-11-08 | End: 2019-11-09 | Stop reason: HOSPADM

## 2019-11-08 RX ORDER — OXYCODONE HYDROCHLORIDE 5 MG/1
5 TABLET ORAL EVERY 6 HOURS
Status: DISCONTINUED | OUTPATIENT
Start: 2019-11-08 | End: 2019-11-09 | Stop reason: HOSPADM

## 2019-11-08 RX ORDER — AMOXICILLIN 250 MG
1 CAPSULE ORAL NIGHTLY
Status: DISCONTINUED | OUTPATIENT
Start: 2019-11-08 | End: 2019-11-09 | Stop reason: HOSPADM

## 2019-11-08 RX ORDER — OXYCODONE HCL 5 MG/5 ML
5 SOLUTION, ORAL ORAL
Status: DISCONTINUED | OUTPATIENT
Start: 2019-11-08 | End: 2019-11-08 | Stop reason: HOSPADM

## 2019-11-08 RX ORDER — AMOXICILLIN 250 MG
1 CAPSULE ORAL
Status: DISCONTINUED | OUTPATIENT
Start: 2019-11-08 | End: 2019-11-09 | Stop reason: HOSPADM

## 2019-11-08 RX ORDER — ACETAMINOPHEN 500 MG
1000 TABLET ORAL EVERY 6 HOURS
Status: DISCONTINUED | OUTPATIENT
Start: 2019-11-08 | End: 2019-11-09 | Stop reason: HOSPADM

## 2019-11-08 RX ORDER — HYDROMORPHONE HYDROCHLORIDE 1 MG/ML
0.1 INJECTION, SOLUTION INTRAMUSCULAR; INTRAVENOUS; SUBCUTANEOUS
Status: DISCONTINUED | OUTPATIENT
Start: 2019-11-08 | End: 2019-11-08 | Stop reason: HOSPADM

## 2019-11-08 RX ORDER — ROPIVACAINE HYDROCHLORIDE 5 MG/ML
INJECTION, SOLUTION EPIDURAL; INFILTRATION; PERINEURAL PRN
Status: DISCONTINUED | OUTPATIENT
Start: 2019-11-08 | End: 2019-11-08 | Stop reason: SURG

## 2019-11-08 RX ORDER — HYDRALAZINE HYDROCHLORIDE 20 MG/ML
5 INJECTION INTRAMUSCULAR; INTRAVENOUS
Status: DISCONTINUED | OUTPATIENT
Start: 2019-11-08 | End: 2019-11-08 | Stop reason: HOSPADM

## 2019-11-08 RX ORDER — LORATADINE 10 MG/1
10 TABLET ORAL DAILY
Status: DISCONTINUED | OUTPATIENT
Start: 2019-11-08 | End: 2019-11-09 | Stop reason: HOSPADM

## 2019-11-08 RX ORDER — HYDROMORPHONE HYDROCHLORIDE 1 MG/ML
0.4 INJECTION, SOLUTION INTRAMUSCULAR; INTRAVENOUS; SUBCUTANEOUS
Status: DISCONTINUED | OUTPATIENT
Start: 2019-11-08 | End: 2019-11-08 | Stop reason: HOSPADM

## 2019-11-08 RX ORDER — ONDANSETRON 2 MG/ML
4 INJECTION INTRAMUSCULAR; INTRAVENOUS
Status: DISCONTINUED | OUTPATIENT
Start: 2019-11-08 | End: 2019-11-08 | Stop reason: HOSPADM

## 2019-11-08 RX ORDER — SODIUM CHLORIDE, SODIUM LACTATE, POTASSIUM CHLORIDE, CALCIUM CHLORIDE 600; 310; 30; 20 MG/100ML; MG/100ML; MG/100ML; MG/100ML
INJECTION, SOLUTION INTRAVENOUS CONTINUOUS
Status: DISCONTINUED | OUTPATIENT
Start: 2019-11-08 | End: 2019-11-08 | Stop reason: HOSPADM

## 2019-11-08 RX ADMIN — SODIUM CHLORIDE, POTASSIUM CHLORIDE, SODIUM LACTATE AND CALCIUM CHLORIDE: 600; 310; 30; 20 INJECTION, SOLUTION INTRAVENOUS at 12:05

## 2019-11-08 RX ADMIN — METOPROLOL SUCCINATE 100 MG: 100 TABLET, EXTENDED RELEASE ORAL at 18:21

## 2019-11-08 RX ADMIN — PROPOFOL 120 MG: 10 INJECTION, EMULSION INTRAVENOUS at 10:52

## 2019-11-08 RX ADMIN — POTASSIUM CHLORIDE, DEXTROSE MONOHYDRATE AND SODIUM CHLORIDE: 150; 5; 450 INJECTION, SOLUTION INTRAVENOUS at 14:38

## 2019-11-08 RX ADMIN — ONDANSETRON 4 MG: 2 INJECTION INTRAMUSCULAR; INTRAVENOUS at 12:14

## 2019-11-08 RX ADMIN — DEXAMETHASONE SODIUM PHOSPHATE 8 MG: 4 INJECTION, SOLUTION INTRAMUSCULAR; INTRAVENOUS at 10:59

## 2019-11-08 RX ADMIN — FENTANYL CITRATE 50 MCG: 50 INJECTION, SOLUTION INTRAMUSCULAR; INTRAVENOUS at 11:16

## 2019-11-08 RX ADMIN — CELECOXIB 200 MG: 200 CAPSULE ORAL at 10:27

## 2019-11-08 RX ADMIN — SODIUM CHLORIDE, POTASSIUM CHLORIDE, SODIUM LACTATE AND CALCIUM CHLORIDE: 600; 310; 30; 20 INJECTION, SOLUTION INTRAVENOUS at 10:29

## 2019-11-08 RX ADMIN — CETIRIZINE HYDROCHLORIDE 10 MG: 10 TABLET, FILM COATED ORAL at 14:35

## 2019-11-08 RX ADMIN — ACETAMINOPHEN 500 MG: 500 TABLET, FILM COATED ORAL at 20:55

## 2019-11-08 RX ADMIN — ACETAMINOPHEN 1000 MG: 500 TABLET, FILM COATED ORAL at 10:27

## 2019-11-08 RX ADMIN — CEFAZOLIN 2 G: 1 INJECTION, POWDER, FOR SOLUTION INTRAVENOUS at 10:52

## 2019-11-08 RX ADMIN — FENTANYL CITRATE 50 MCG: 50 INJECTION, SOLUTION INTRAMUSCULAR; INTRAVENOUS at 11:50

## 2019-11-08 RX ADMIN — LIDOCAINE HYDROCHLORIDE 0.5 ML: 10 INJECTION, SOLUTION INFILTRATION; PERINEURAL at 10:28

## 2019-11-08 RX ADMIN — OXYBUTYNIN CHLORIDE 5 MG: 5 TABLET ORAL at 18:25

## 2019-11-08 RX ADMIN — OXYCODONE HYDROCHLORIDE 5 MG: 5 TABLET ORAL at 14:33

## 2019-11-08 RX ADMIN — TRANEXAMIC ACID 1500 MG: 100 INJECTION, SOLUTION INTRAVENOUS at 10:59

## 2019-11-08 RX ADMIN — EPHEDRINE SULFATE 10 MG: 50 INJECTION INTRAMUSCULAR; INTRAVENOUS; SUBCUTANEOUS at 11:00

## 2019-11-08 RX ADMIN — ATORVASTATIN CALCIUM 40 MG: 40 TABLET, FILM COATED ORAL at 20:55

## 2019-11-08 RX ADMIN — RIVAROXABAN 15 MG: 15 TABLET, FILM COATED ORAL at 20:56

## 2019-11-08 RX ADMIN — MAGNESIUM SULFATE HEPTAHYDRATE 4 G: 500 INJECTION, SOLUTION INTRAMUSCULAR; INTRAVENOUS at 11:00

## 2019-11-08 RX ADMIN — CEFAZOLIN 2 G: 10 INJECTION, POWDER, FOR SOLUTION INTRAVENOUS; PARENTERAL at 18:20

## 2019-11-08 RX ADMIN — OXYCODONE HYDROCHLORIDE 5 MG: 5 TABLET ORAL at 20:56

## 2019-11-08 RX ADMIN — ROPIVACAINE HYDROCHLORIDE 18 ML: 5 INJECTION, SOLUTION EPIDURAL; INFILTRATION; PERINEURAL at 10:39

## 2019-11-08 RX ADMIN — ACETAMINOPHEN 500 MG: 500 TABLET, FILM COATED ORAL at 23:25

## 2019-11-08 RX ADMIN — ACETAMINOPHEN 1000 MG: 500 TABLET, FILM COATED ORAL at 14:33

## 2019-11-08 RX ADMIN — FENTANYL CITRATE 50 MCG: 50 INJECTION, SOLUTION INTRAMUSCULAR; INTRAVENOUS at 10:52

## 2019-11-08 RX ADMIN — LIDOCAINE HYDROCHLORIDE 50 MG: 20 INJECTION, SOLUTION EPIDURAL; INFILTRATION; INTRACAUDAL; PERINEURAL at 10:52

## 2019-11-08 RX ADMIN — TAMSULOSIN HYDROCHLORIDE 0.4 MG: 0.4 CAPSULE ORAL at 18:23

## 2019-11-08 ASSESSMENT — CHA2DS2 SCORE
AGE 75 OR GREATER: YES
CHF OR LEFT VENTRICULAR DYSFUNCTION: NO
DIABETES: NO
HYPERTENSION: NO
VASCULAR DISEASE: NO
SEX: MALE
PRIOR STROKE OR TIA OR THROMBOEMBOLISM: NO
CHA2DS2 VASC SCORE: 2
AGE 65 TO 74: NO

## 2019-11-08 ASSESSMENT — COGNITIVE AND FUNCTIONAL STATUS - GENERAL
MOVING FROM LYING ON BACK TO SITTING ON SIDE OF FLAT BED: A LITTLE
DRESSING REGULAR UPPER BODY CLOTHING: A LITTLE
STANDING UP FROM CHAIR USING ARMS: A LITTLE
WALKING IN HOSPITAL ROOM: A LITTLE
TOILETING: A LITTLE
MOBILITY SCORE: 15
HELP NEEDED FOR BATHING: A LITTLE
CLIMB 3 TO 5 STEPS WITH RAILING: A LOT
MOVING TO AND FROM BED TO CHAIR: A LOT
DRESSING REGULAR LOWER BODY CLOTHING: A LITTLE
SUGGESTED CMS G CODE MODIFIER DAILY ACTIVITY: CJ
TURNING FROM BACK TO SIDE WHILE IN FLAT BAD: A LOT
SUGGESTED CMS G CODE MODIFIER MOBILITY: CK
DAILY ACTIVITIY SCORE: 20

## 2019-11-08 ASSESSMENT — LIFESTYLE VARIABLES
EVER FELT BAD OR GUILTY ABOUT YOUR DRINKING: NO
HAVE PEOPLE ANNOYED YOU BY CRITICIZING YOUR DRINKING: NO
ON A TYPICAL DAY WHEN YOU DRINK ALCOHOL HOW MANY DRINKS DO YOU HAVE: 0
CONSUMPTION TOTAL: NEGATIVE
HOW MANY TIMES IN THE PAST YEAR HAVE YOU HAD 5 OR MORE DRINKS IN A DAY: 0
ALCOHOL_USE: NO
EVER_SMOKED: NEVER
EVER HAD A DRINK FIRST THING IN THE MORNING TO STEADY YOUR NERVES TO GET RID OF A HANGOVER: NO
TOTAL SCORE: 0
HAVE YOU EVER FELT YOU SHOULD CUT DOWN ON YOUR DRINKING: NO
TOTAL SCORE: 0
EVER_SMOKED: NEVER
TOTAL SCORE: 0
AVERAGE NUMBER OF DAYS PER WEEK YOU HAVE A DRINK CONTAINING ALCOHOL: 0

## 2019-11-08 ASSESSMENT — PATIENT HEALTH QUESTIONNAIRE - PHQ9
SUM OF ALL RESPONSES TO PHQ9 QUESTIONS 1 AND 2: 0
2. FEELING DOWN, DEPRESSED, IRRITABLE, OR HOPELESS: NOT AT ALL
1. LITTLE INTEREST OR PLEASURE IN DOING THINGS: NOT AT ALL

## 2019-11-08 ASSESSMENT — COPD QUESTIONNAIRES
COPD SCREENING SCORE: 3
HAVE YOU SMOKED AT LEAST 100 CIGARETTES IN YOUR ENTIRE LIFE: NO/DON'T KNOW
COPD SCREENING SCORE: 3
DURING THE PAST 4 WEEKS HOW MUCH DID YOU FEEL SHORT OF BREATH: SOME OF THE TIME
DO YOU EVER COUGH UP ANY MUCUS OR PHLEGM?: NO/ONLY WITH OCCASIONAL COLDS OR INFECTIONS
IN THE PAST 12 MONTHS DO YOU DO LESS THAN YOU USED TO BECAUSE OF YOUR BREATHING PROBLEMS: DISAGREE/UNSURE
HAVE YOU SMOKED AT LEAST 100 CIGARETTES IN YOUR ENTIRE LIFE: NO/DON'T KNOW
DURING THE PAST 4 WEEKS HOW MUCH DID YOU FEEL SHORT OF BREATH: SOME OF THE TIME
DO YOU EVER COUGH UP ANY MUCUS OR PHLEGM?: NO/ONLY WITH OCCASIONAL COLDS OR INFECTIONS

## 2019-11-08 NOTE — ANESTHESIA QCDR
2019 [facility] Clinical Data Registry (for Quality Improvement)     Postoperative nausea/vomiting risk protocol (Adult = 18 yrs and Pediatric 3-17 yrs)- (430 and 463)  General inhalation anesthetic (NOT TIVA) with PONV risk factors: Yes  Provision of anti-emetic therapy with at least 2 different classes of agents: Yes   Patient DID NOT receive anti-emetic therapy and reason is documented in Medical Record:  N/A    Multimodal Pain Management- (AQI59)  Patient undergoing Elective Surgery (i.e. Outpatient, or ASC, or Prescheduled Surgery prior to Hospital Admission): Yes  Use of Multimodal Pain Management, two or more drugs and/or interventions, NOT including systemic opioids: Yes   Exception: Documented allergy to multiple classes of analgesics:  N/A    PACU assessment of acute postoperative pain prior to Anesthesia Care End- Applies to Patients Age = 18- (ABG7)  Initial PACU pain score is which of the following: < 7/10  Patient unable to report pain score: N/A    Post-anesthetic transfer of care checklist/protocol to PACU/ICU- (426 and 427)  Upon conclusion of case, patient transferred to which of the following locations: PACU/Non-ICU  Use of transfer checklist/protocol: Yes  Exclusion: Service Performed in Patient Hospital Room (and thus did not require transfer): N/A    PACU Reintubation- (AQI31)  General anesthesia requiring endotracheal intubation (ETT) along with subsequent extubation in OR or PACU: No  Required reintubation in the PACU: N/A  Extubation was a planned trial documented in the medical record prior to removal of the original airway device: N/A    Unplanned admission to ICU related to anesthesia service up through end of PACU care- (MD51)  Unplanned admission to ICU (not initially anticipated at anesthesia start time): No

## 2019-11-08 NOTE — ANESTHESIA TIME REPORT
Anesthesia Start and Stop Event Times     Date Time Event    11/8/2019 1041 Ready for Procedure     1047 Anesthesia Start     1223 Anesthesia Stop        Responsible Staff  11/08/19    Name Role Begin End    Shawn Taylor M.D. Anesth 1047 1223        Preop Diagnosis (Free Text):  Pre-op Diagnosis     UNILATERAL PRIMARY OSTEOARTHRITIS RIGHT KNEE WITH PAIN        Preop Diagnosis (Codes):    Post op Diagnosis  Osteoarthritis of right knee      Premium Reason  Non-Premium    Comments:

## 2019-11-08 NOTE — ANESTHESIA POSTPROCEDURE EVALUATION
Patient: Gumaro Garsia    Procedure Summary     Date:  11/08/19 Room / Location:   OR 06 / SURGERY [facility]    Anesthesia Start:  1047 Anesthesia Stop:  1223    Procedure:  ARTHROPLASTY, KNEE, TOTAL (Right Knee) Diagnosis:  (UNILATERAL PRIMARY OSTEOARTHRITIS RIGHT KNEE WITH PAIN)    Surgeon:  Clovis Cabral M.D. Responsible Provider:  Shawn Taylor M.D.    Anesthesia Type:  general, peripheral nerve block ASA Status:  2          Final Anesthesia Type: general, peripheral nerve block  Last vitals  BP   Blood Pressure : 137/89    Temp   37.4 °C (99.3 °F)    Pulse   Pulse: 86   Resp   16    SpO2   96 %      Anesthesia Post Evaluation    Patient location during evaluation: PACU  Patient participation: complete - patient participated  Level of consciousness: awake and alert    Airway patency: patent  Anesthetic complications: no  Cardiovascular status: hemodynamically stable  Respiratory status: acceptable  Hydration status: euvolemic    PONV: none           Nurse Pain Score: 3 (NPRS)

## 2019-11-08 NOTE — CARE PLAN
Problem: Communication  Goal: The ability to communicate needs accurately and effectively will improve  Outcome: PROGRESSING AS EXPECTED  Intervention: [facility] patient and significant other/support system to call light to alert staff of needs  Flowsheets (Taken 11/8/2019 0482)  Oriented to:: All of the Following : Location of Bathroom, Visiting Policy, Unit Routine, Call Light and Bedside Controls, Bedside Rail Policy, Smoking Policy, Rights and Responsibilities, Bedside Report, and Patient Education Notebook  Note:   Pt and family member oriented to unit. Pt instructed to utilize call light to call for assistance as needed. Pt verbalized understanding. Hourly rounding in place to ensure needs are met.       Problem: Safety  Goal: Will remain free from falls  Outcome: PROGRESSING AS EXPECTED  Intervention: Implement fall precautions  Note:   Environmental fall precautions and hourly rounding in place. Pt instructed to call for assistance before attempting to mobilize. Pt education given re: necessity of 2x staff assist after joint replacement. Pt verbalized understanding.

## 2019-11-08 NOTE — OR NURSING
1221 received from or  Oral airway intact  resp spont r knee dressing c/d/i  Dp2+  Foot warm  Ice pack applied

## 2019-11-08 NOTE — ANESTHESIA PROCEDURE NOTES
Airway  Date/Time: 11/8/2019 10:52 AM  Performed by: Shawn Taylor M.D.  Authorized by: Shawn Taylor M.D.     Location:  OR  Urgency:  Elective  Difficult Airway: No    Indications for Airway Management:  Anesthesia  Spontaneous Ventilation: absent    Sedation Level:  Deep  Preoxygenated: Yes    Mask Difficulty Assessment:  1 - vent by mask  Final Airway Type:  Supraglottic airway  Final Supraglottic Airway:  Standard LMA  SGA Size:  4  Number of Attempts at Approach:  1

## 2019-11-08 NOTE — ANESTHESIA PROCEDURE NOTES
Peripheral Block  Date/Time: 11/8/2019 10:38 AM  Performed by: Shawn Taylor M.D.  Authorized by: Shawn Taylor M.D.     Patient Location:  Pre-op  Start Time:  11/8/2019 10:38 AM  End Time:  11/8/2019 10:40 AM  Reason for Block: at surgeon's request and post-op pain management    patient identified, IV checked, site marked, risks and benefits discussed, surgical consent, monitors and equipment checked, pre-op evaluation and timeout performed    Patient Position:  Supine  Prep: ChloraPrep    Monitoring:  Heart rate, continuous pulse ox and cardiac monitor  Block Region:  Lower Extremity  Lower Extremity - Block Type:  Selective FEMORAL nerve block at the Adductor Canal    Laterality:  Right  Procedures: ultrasound guided  Image captured, interpreted and electronically stored.  Local Infiltration:  Lidocaine  Strength:  1 %  Dose:  3 ml  Block Type:  Single-shot  Needle Length:  100mm  Needle Gauge:  21 G  Needle Localization:  Ultrasound guidance  Injection Assessment:  Negative aspiration for heme, no paresthesia on injection, incremental injection and local visualized surrounding nerve on ultrasound  Evidence of intravascular injection: No     US Guided Selective Femoral Nerve Block at Adductor Canal:   US probe placed at mid-thigh level on externally rotated leg and femur identified.  Probe directed medially until Sartorius Muscle (SM), Femoral Artery (FA) and Saphenous Nerve (SN) identified in Adductor Canal (AC).  Needle inserted anterolateral to probe in an in plane approach into a subsartorial perivascular perineural position.  After negative aspiration LA injected with ease and visualized spreading within the AC.

## 2019-11-08 NOTE — OP REPORT
DATE OF SERVICE:  11/08/2019    PREOPERATIVE DIAGNOSIS:  Right knee degenerative joint disease.    POSTOPERATIVE DIAGNOSIS:  Right knee degenerative joint disease.    PROCEDURE:  Right total knee arthroplasty.    SURGEON:.  Clovis Cabral MD    ASSISTANT:  Tina Mohr PA-C    ANESTHESIA:  General and an adductor canal nerve block.    ANESTHESIOLOGIST:  Shawn Taylor MD    IMPLANTS:  Botello [employer] Nephew Journey II size 5 cruciate retaining Oxinium   femoral component, a size 5 tibial component, a 32 mm oval patellar button and   a 9 mm articular insert and one 2.8 mm Q-Fix suture anchor.    COMPLICATIONS:  None.    DISPOSITION:  Stable to postanesthesia care unit.    INDICATIONS:  The patient has had progressive knee pain, which has been   unresponsive to conservative management.  The risks, benefits, alternatives,   limitations of surgical intervention were discussed in detail.  He expressed   understanding and desired to proceed.    DESCRIPTION OF PROCEDURE:  The patient and the correct operative extremity   were identified in the preoperative area.  The knee was marked.  He was   brought to the operating room where the correct operative extremity was again   confirmed.  He was placed supine on the OR table where he underwent an   adductor canal nerve block performed at my request for postop pain control.    He underwent general anesthesia without complication.  Examination under   anesthesia showed about a 10-15 degree flexion contracture, no instability of   varus deformity.  The knee was prepped with alcohol.  This was allowed to dry.    The knee was then prepped and draped in the usual sterile fashion using   ChloraPrep and Esmarch used to exsanguinate the right lower extremity,   tourniquet inflated to 250 mmHg.  A 12 cm longitudinal incision was centered   over the patella.  Sharp dissection carried down to level of the peritenon.    He had significant prepatellar bursitis and that was resected.  We then    performed medial parapatellar arthrotomy, performed wide medial release,   excised the anterior horns of the medial and lateral menisci, excised the fat   pad, placed a centering drill in the distal femur, then did place the   intramedullary guide, did an intramedullary distal femoral resection and   extramedullary tibial resection, then placed a 9 mm spacer block, it fit well.    We excised the posterior horns of the medial and lateral menisci.  I then   performed the finishing cuts on the femur, removed the osteophytes from the   posterior aspect of the knee.  He had some moderate synovitis, which was   resected as well.  I then placed trials on the femur and the tibia, finished   the box cut on the femur, then placed a trial 9 mm articular insert, he had   full range of motion and excellent stability, central patellar tracking.  We   everted the patella, there was moderate degenerative change, performed a   freehand patellar resection, placed a 32 mm round patellar button, again the   patella tracked centrally.  The patella measured about 22 mm prior to   resection and about 23 mm after placing the button.  We then punched the   distal femur and the proximal tibia, then removed the trials from the knee,   vacuum irrigated and dried the bony surfaces as well as cement was vacuum   mixed after plugging the distal femoral canal with bone, then cemented the   tibia followed by the femur, then the patella using third generation   technique.  We allowed the cement to cure completely while the knee was   bathing in a Betadine solution.  We then deflated the tourniquet, obtained   hemostasis, removed all the excess cement from around the knee, copiously   irrigated the wound, then placed the real 9 mm articular insert into position,   snapped that down until the Luo taper seated completely.  Again, took the   knee through a full range of motion, had central patellar tracking, excellent   balance in flexion and  extension.  With initial flexion of the knee, he had a   little peel off of the patellar tendon distally.  Because of that, we placed   one 2.8 mm Q-Fix and two horizontal mattress stitches to hold that area of   peel back down.  We then closed the extensor mechanism using interrupted #1   Vicryl suture and a running #2 Quill stitch, then irrigated the wound again,   closed the deep subQ with Monocryl, closed the skin with staples.  The knee   was injected with 0.5% bupivacaine with epinephrine.  Sterile dressings were   applied.  The knee was loosely overwrapped with an Ace wrap.  A JAYLEEN stocking   was placed.  The patient was then allowed to awake from anesthesia,   transferred to his hospital cart, and taken to the postanesthesia care unit in   stable condition.  He tolerated the procedure well.  There were no immediate   complications.       ____________________________________     MARIBEL DAWSON MD RD / MARIBEL    DD:  11/08/2019 12:32:45  DT:  11/08/2019 13:24:28    D#:  [id number]  Job#:  [id number]

## 2019-11-08 NOTE — OR NURSING
Patient to preop, allergies and NPO status verified, home medications reconciled, belongings secured, verbalizes understanding of pain scale, surgical site verified, IV access established, SCDs/ JAYLEEN hose in place, triple aim completed.

## 2019-11-08 NOTE — PROGRESS NOTES
Pt arrived to floor from PACU. Assumed care of patient. Discussed daily plan of care, oriented patient and family member to floor. VSS. Pt awake and alert, reports feeling lightheaded but denies significant pain or nausea. 4 LPM supplemental O2 in place. Pt agreeable to possible discharge home today. Hourly rounding in place

## 2019-11-08 NOTE — ANESTHESIA PREPROCEDURE EVALUATION
"Past Medical History:   Diagnosis Date   • A-fib (HCC) 3-23-17   • Arrhythmia     ATRIAL FLUTTER; cardiologist, Dr. Unger ([facility])   • Arthritis 3-23-17    \"Everywhere\"   • Atrial flutter (HCC) 1/4/2012    S/p ablation    • Cancer (HCC) 2007    PROSTATE TREATED WITH RADIATION    • Cancer (HCC) 2015    SKIN TREATED WITH MOHS   • Cataract     IOL OU   • Dry cough 10/26/2019    resolved   • Heart burn 3-23-17    Takes PEPCID   • Heart murmur     \"slight\"   • High cholesterol 3-23-17    Controlled with medication   • Lipids abnormal 1/4/2012   • Obstructive sleep apnea 3-23-17    CPAP USE &  Oxygen concentrator 4L nightly   • Pain     right shoulder   • Pain 11/11/15    left knee right hip   • Pain 3-23-17    \"Left Hip & Right Shoulder\"   • Post-nasal drip 3-23-17   • Rotator cuff arthropathy    • Unspecified disorder of thyroid     LOW THYROID   • Urinary bladder disorder 3-23-17    Takes [place]; bladder urgency   • Urinary incontinence 3-23-17         Relevant Problems   ANESTHESIA   (+) Obstructive sleep apnea      ENDO   (+) Hypothyroid       Physical Exam    Airway   Mallampati: II  TM distance: >3 FB  Neck ROM: full       Cardiovascular - normal exam  Rhythm: regular  Rate: normal  (-) murmur     Dental - normal exam         Pulmonary - normal exam  Breath sounds clear to auscultation     Abdominal    Neurological - normal exam                 Anesthesia Plan    ASA 2       Plan - general and peripheral nerve block     Peripheral nerve block will be post-op pain control  Airway plan will be LMA        Induction: intravenous    Postoperative Plan: Postoperative administration of opioids is intended.    Pertinent diagnostic labs and testing reviewed    Informed Consent:    Anesthetic plan and risks discussed with patient.        "

## 2019-11-09 VITALS
RESPIRATION RATE: 18 BRPM | SYSTOLIC BLOOD PRESSURE: 108 MMHG | TEMPERATURE: 97.5 F | WEIGHT: 197.75 LBS | OXYGEN SATURATION: 92 % | DIASTOLIC BLOOD PRESSURE: 60 MMHG | HEIGHT: 70 IN | BODY MASS INDEX: 28.31 KG/M2 | HEART RATE: 69 BPM

## 2019-11-09 PROCEDURE — 700111 HCHG RX REV CODE 636 W/ 250 OVERRIDE (IP): Performed by: ORTHOPAEDIC SURGERY

## 2019-11-09 PROCEDURE — A9270 NON-COVERED ITEM OR SERVICE: HCPCS | Performed by: ORTHOPAEDIC SURGERY

## 2019-11-09 PROCEDURE — 94660 CPAP INITIATION&MGMT: CPT

## 2019-11-09 PROCEDURE — 700105 HCHG RX REV CODE 258: Performed by: ORTHOPAEDIC SURGERY

## 2019-11-09 PROCEDURE — 700112 HCHG RX REV CODE 229: Performed by: ORTHOPAEDIC SURGERY

## 2019-11-09 PROCEDURE — 97162 PT EVAL MOD COMPLEX 30 MIN: CPT

## 2019-11-09 PROCEDURE — 97165 OT EVAL LOW COMPLEX 30 MIN: CPT

## 2019-11-09 PROCEDURE — G0378 HOSPITAL OBSERVATION PER HR: HCPCS

## 2019-11-09 PROCEDURE — 700102 HCHG RX REV CODE 250 W/ 637 OVERRIDE(OP): Performed by: ORTHOPAEDIC SURGERY

## 2019-11-09 RX ADMIN — CEFAZOLIN 2 G: 10 INJECTION, POWDER, FOR SOLUTION INTRAVENOUS; PARENTERAL at 02:49

## 2019-11-09 RX ADMIN — DOCUSATE SODIUM 100 MG: 100 CAPSULE, LIQUID FILLED ORAL at 05:35

## 2019-11-09 RX ADMIN — LIOTHYRONINE SODIUM 5 MCG: 5 TABLET ORAL at 05:36

## 2019-11-09 RX ADMIN — LORATADINE 10 MG: 10 TABLET ORAL at 05:36

## 2019-11-09 RX ADMIN — CETIRIZINE HYDROCHLORIDE 10 MG: 10 TABLET, FILM COATED ORAL at 05:36

## 2019-11-09 RX ADMIN — LEVOTHYROXINE SODIUM 100 MCG: 100 TABLET ORAL at 05:36

## 2019-11-09 RX ADMIN — OXYCODONE HYDROCHLORIDE 5 MG: 5 TABLET ORAL at 02:49

## 2019-11-09 RX ADMIN — ACETAMINOPHEN 1000 MG: 500 TABLET, FILM COATED ORAL at 05:35

## 2019-11-09 RX ADMIN — OXYBUTYNIN CHLORIDE 5 MG: 5 TABLET ORAL at 05:36

## 2019-11-09 RX ADMIN — OXYCODONE HYDROCHLORIDE 5 MG: 5 TABLET ORAL at 08:19

## 2019-11-09 ASSESSMENT — COGNITIVE AND FUNCTIONAL STATUS - GENERAL
TOILETING: A LITTLE
CLIMB 3 TO 5 STEPS WITH RAILING: A LITTLE
HELP NEEDED FOR BATHING: A LITTLE
SUGGESTED CMS G CODE MODIFIER DAILY ACTIVITY: CJ
DRESSING REGULAR UPPER BODY CLOTHING: A LITTLE
MOBILITY SCORE: 23
SUGGESTED CMS G CODE MODIFIER MOBILITY: CI
DAILY ACTIVITIY SCORE: 20
DRESSING REGULAR LOWER BODY CLOTHING: A LITTLE

## 2019-11-09 ASSESSMENT — GAIT ASSESSMENTS
DISTANCE (FEET): 100
DEVIATION: STEP TO
GAIT LEVEL OF ASSIST: SUPERVISED
ASSISTIVE DEVICE: FRONT WHEEL WALKER

## 2019-11-09 ASSESSMENT — ACTIVITIES OF DAILY LIVING (ADL): TOILETING: INDEPENDENT

## 2019-11-09 NOTE — THERAPY
PT eval not completed, due to RN concerns of patient becoming dizzy and lightheaded with EOB mobility. Pt was not able to ambulate with RN due to activity tolerance concerns and O2 needs. Discussed with son and patient with current plan of care and to attempt therapy tomorrow due to poor mobility and tolerance with RN. Discussed role of OT and PT. Patient provided with layout of therapy evaluation and expectations for safe d/c home. Pt agreeable with plan and encouarged to ambulate with [facility] staff as able. Will re-attempt tomorrow.

## 2019-11-09 NOTE — DISCHARGE INSTRUCTIONS
Discharge Instructions    Discharged to home by car with relative. Discharged via wheelchair, hospital escort: Yes.  Special equipment needed: Not Applicable    Be sure to schedule a follow-up appointment with your primary care doctor or any specialists as instructed.     Discharge Plan:   Influenza Vaccine Indication: Patient Refuses    I understand that a diet low in cholesterol, fat, and sodium is recommended for good health. Unless I have been given specific instructions below for another diet, I accept this instruction as my diet prescription.   Other diet: n/a    Special Instructions: Discharge instructions for the Orthopedic Patient    Follow up with Primary Care Physician within 2 weeks of discharge to home, regarding:  Review of medications and diagnostic testing.  Surveillance for medical complications.  Workup and treatment of osteoporosis, if appropriate.     -Is this a Joint Replacement patient? Yes Total Joint Knee Replacement Discharge Instructions    Pain  - The goal is to slowly wean off the prescription pain medicine.  - Ice can be used for pain control.  20 minutes at a time is recommended, and never directly against your skin or incision.  - Most patients are off the pain pills by 3 weeks; others may require a low level of pain medications for many months.  If your pain continues to be severe, follow up with your physician.  Infection    Knee joint infections; occur in fewer than 2% of patients. The most common causes of infection following total knee replacement surgery are from bacteria that enter the bloodstream during dental procedures, urinary tract infections, or skin infections. These bacteria can lodge around your knee replacement and cause an infection.  - Keep the incision as clean and dry as possible.  - Always wash your hands before touching your incision.  - Skin infections tend to develop around 7-10 days after surgery; most can be treated with oral antibiotics.  - Dental Care  should be delayed for 3 months after surgery, your surgeon recommends taking a dose of antibiotics 1 hour prior to any dental procedure. After 2 years, most surgeons recommend antibiotics only before an extensive procedure.  Ask your surgeon what he recommends.  - Signs and symptoms of infection can include:  low grade fever, redness, pain, swelling and drainage from your incision.  Notify your surgeon immediately if you develop any of these symptoms.  Other instructions  - Bowel habits - constipation is extremely common and is caused by a combination of anesthesia, lack of mobility and pain medicine.  Use stool softeners or laxatives if necessary. It is important not to ignore this problem, as bowel obstructions can be a serious complication after joint replacement surgery.  - Mood/Energy Level - Many patients experience a lack of energy and endurance for up to 2-3 months after surgery.  Some may also feel down and can even become depressed.  This is likely due to the postoperative anemia, change in activity level, lack of sleep, pain medicine and just the emotional reaction to the surgery itself that is a big disruption in a person’s life.  This usually passes.  If symptoms persist, follow up with your primary physician.  - Returning to work - Your surgeon will give you more specific instructions. Depending on the type of activities you perform, it may be 6 to 8 weeks before you return to work.   Generally, if you work a sedentary job requiring little standing or walking, most patients may return within 2-6 weeks.  Manual labor jobs involving walking, lifting and standing may take longer. Your surgeon’s office can provide a release to part-time or light duty work early on in your recovery and progress you to full duty as able.    - Driving - If your left knee was replaced and you have an automatic transmission, you may be able to begin driving in a week or so, provided you are no longer taking narcotic pain  medication. If your right knee was replaced, avoid driving for 6 to 8 weeks. Remember that your reflexes may not be as sharp as before your surgery. Ask your surgeon for specific instructions.   - Avoiding falls - A fall during the first few weeks after surgery can damage your new knee and may result in a need for further surgery.   throw rugs and tack down loose carpeting.  Be aware of floor hazards such as pets, small objects or uneven surfaces.    - [employer] Metal Detectors - The sensitivity of metal detectors varies and it is likely that your prosthesis will cause an alarm.  Inform the  of your artificial joint.  Diet  - Resume your normal diet as tolerated.  - It is important to achieve a healthy nutritional status by eating a well balanced diet on a regular basis.  - Your physician may recommend that you take iron and vitamin supplements.   - Continue to drink plenty of fluids.  Shower/Bathing  - You may shower as soon as you get home from the hospital unless otherwise instructed.  - Keep your incision out of water.  To keep the incision dry when showering, cover it with a plastic bag or plastic wrap.  - Pat incision dry if it gets wet.  Don’t rub.  - Do not submerge in a bath until staples are out and the incision is completely healed. (Approximately 6-8 weeks)  Dressing Change:  Procedure (if recommended by your physician)  - Wash hands.  - Open all new dressing change materials.  - Remove old dressing and discard.  - Inspect incision for redness, increase in clear drainage, yellow/green drainage, odor and surrounding skin hot to touch.  -  ABD (large gauze) pad or “island dressing” by one corner and lay over the incision.  Be careful not to touch the inside of the dressing that will lay over the incision.  - Secure in place as instructed (Ace wrap or tape).    Swelling/Bruising    - Swelling can last from 3-6 months.  - Elevate your leg higher than your heart while reclining.    The first week you are home you should elevate your leg an equal amount of time, as you are active.    - Anti-inflammatory pills can be taken once you have stopped the blood thinners.  - The swelling is usually worse after you go home since you are upright for longer periods of time.  - Bruising is common and can involve the entire leg including the thigh, calf and even foot. Bruising often does not appear until after you arrive home and it can be quite dramatic- purple, black, and green.  The bruising you can see is not usually concerning and will subside without any treatment.      Blood Clot Prevention  Blood clots in the legs and the less common, but frightening, clots that travel to the lungs are a real focus of our preventative. Most patients are at standard risk for them, but those patients who are at higher risk include people who have had previous clots, a family history of clotting, smoking, diabetes, obesity, advanced age, use of estrogen and a sedentary lifestyle.    - Signs of blood clots in legs - Swelling in thigh, calf or ankle that does not go down with elevation.  Pain, heat and tenderness in calf, back of calf or groin area.  NOTE: blood clots can occur in either leg.  - You have been receiving anticoagulant therapy (blood thinners) in the hospital and you may be instructed to continue at home depending on your risk factors.  - Your risk for developing a clot continues for up to 2-3 months after surgery.  You should avoid prolonged sitting and dehydration during that time (long air trips and car trips).  If you do take a trip during this time, please get up and move around every 1- 1.5 hours.  - If you are prescribed blood-thinning medication for home, follow instructions as directed. (Handouts provided if applicable).      Activity  Once home, you should continue to stay active. The key is to remember not to overdo it! While you can expect some good days and some bad days, you should notice a  gradual improvement and a gradual increase in your endurance over the next 6 to 12 months. Exercise is a critical component of home care, particularly during the first few weeks after surgery.     - Normal activities of daily living You should be able to resume most within 3 to 6 weeks following surgery. Some pain with activity and at night is common for several weeks after surgery  -  Physical Therapy Exercises - Continue to do the exercises prescribed for at least two months after surgery. Riding a stationary bicycle can help maintain muscle tone and keep your knee flexible. Try to achieve the maximum degree of bending and extension possible. (handout provided by Therapist).  - Sexual Activity -. Your surgeon can tell you when it safe to resume sexual activity.    - Sleeping Positions - You can safely sleep on your back, on either side, or on your stomach.   - Other Activities - Walk as much as you like, but remember that walking is no substitute for the exercises your doctor and physical therapist will prescribe. Lower impact activities are preferred.  If you have specific questions, consult your Surgeon.    When to Call the Doctor   Call the physician if:   - Fever over 100.5? F  - Increased pain, drainage, redness, odor or heat around the incision area  - Shaking chills  - Increased knee pain with activity and rest  - Increased pain in calf, tenderness or redness above or below the knee  - Increased swelling of calf, ankle, foot  - Sudden increased shortness of breath, sudden onset of chest pain, localized chest pain with coughing  - Incision opening  Or, if there are any questions or concerns about medications or care.       -Is this patient being discharged with medication to prevent blood clots?  Yes, Xarelto Rivaroxaban oral tablets  What is this medicine?  RIVAROXABAN (ri va SHALONDA a ban) is an anticoagulant (blood thinner). It is used to treat blood clots in the lungs or in the veins. It is also used after  knee or hip surgeries to prevent blood clots. It is also used to lower the chance of stroke in people with a medical condition called atrial fibrillation.  This medicine may be used for other purposes; ask your health care provider or pharmacist if you have questions.  COMMON BRAND NAME(S): Xarelto, Xarelto Starter Pack  What should I tell my health care provider before I take this medicine?  They need to know if you have any of these conditions:  -bleeding disorders  -bleeding in the brain  -blood in your stools (black or tarry stools) or if you have blood in your vomit  -history of stomach bleeding  -kidney disease  -liver disease  -low blood counts, like low white cell, platelet, or red cell counts  -recent or planned spinal or epidural procedure  -take medicines that treat or prevent blood clots  -an unusual or allergic reaction to rivaroxaban, other medicines, foods, dyes, or preservatives  -pregnant or trying to get pregnant  -breast-feeding  How should I use this medicine?  Take this medicine by mouth with a glass of water. Follow the directions on the prescription label. Take your medicine at regular intervals. Do not take it more often than directed. Do not stop taking except on your doctor's advice. Stopping this medicine may increase your risk of a blood clot. Be sure to refill your prescription before you run out of medicine.  If you are taking this medicine after hip or knee replacement surgery, take it with or without food. If you are taking this medicine for atrial fibrillation, take it with your evening meal. If you are taking this medicine to treat blood clots, take it with food at the same time each day. If you are unable to swallow your tablet, you may crush the tablet and mix it in applesauce. Then, immediately eat the applesauce. You should eat more food right after you eat the applesauce containing the crushed tablet.  Talk to your pediatrician regarding the use of this medicine in children.  Special care may be needed.  Overdosage: If you think you have taken too much of this medicine contact a poison control center or emergency room at once.  NOTE: This medicine is only for you. Do not share this medicine with others.  What if I miss a dose?  If you take your medicine once a day and miss a dose, take the missed dose as soon as you remember. If you take your medicine twice a day and miss a dose, take the missed dose immediately. In this instance, 2 tablets may be taken at the same time. The next day you should take 1 tablet twice a day as directed.  What may interact with this medicine?  Do not take this medicine with any of the following medications:  -defibrotide  This medicine may also interact with the following medications:  -aspirin and aspirin-like medicines  -certain antibiotics like erythromycin, azithromycin, and clarithromycin  -certain medicines for fungal infections like ketoconazole and itraconazole  -certain medicines for irregular heart beat like amiodarone, quinidine, dronedarone  -certain medicines for seizures like carbamazepine, phenytoin  -certain medicines that treat or prevent blood clots like warfarin, enoxaparin, and dalteparin  -conivaptan  -diltiazem  -felodipine  -indinavir  -lopinavir; ritonavir  -NSAIDS, medicines for pain and inflammation, like ibuprofen or naproxen  -ranolazine  -rifampin  -ritonavir  -SNRIs, medicines for depression, like desvenlafaxine, duloxetine, levomilnacipran, venlafaxine  -SSRIs, medicines for depression, like citalopram, escitalopram, fluoxetine, fluvoxamine, paroxetine, sertraline  -Mineral's wort  -verapamil  This list may not describe all possible interactions. Give your health care provider a list of all the medicines, herbs, non-prescription drugs, or dietary supplements you use. Also tell them if you smoke, drink alcohol, or use illegal drugs. Some items may interact with your medicine.  What should I watch for while using this  medicine?  Visit your doctor or health care professional for regular checks on your progress.  Notify your doctor or health care professional and seek emergency treatment if you develop breathing problems; changes in vision; chest pain; severe, sudden headache; pain, swelling, warmth in the leg; trouble speaking; sudden numbness or weakness of the face, arm or leg. These can be signs that your condition has gotten worse.  If you are going to have surgery or other procedure, tell your doctor that you are taking this medicine.  What side effects may I notice from receiving this medicine?  Side effects that you should report to your doctor or health care professional as soon as possible:  -allergic reactions like skin rash, itching or hives, swelling of the face, lips, or tongue  -back pain  -redness, blistering, peeling or loosening of the skin, including inside the mouth  -signs and symptoms of bleeding such as bloody or black, tarry stools; red or dark-brown urine; spitting up blood or brown material that looks like coffee grounds; red spots on the skin; unusual bruising or bleeding from the eye, gums, or nose  Side effects that usually do not require medical attention (report to your doctor or health care professional if they continue or are bothersome):  -dizziness  -muscle pain  This list may not describe all possible side effects. Call your doctor for medical advice about side effects. You may report side effects to FDA at 7-312-FDA-9843.  Where should I keep my medicine?  Keep out of the reach of children.  Store at room temperature between 15 and 30 degrees C (59 and 86 degrees F). Throw away any unused medicine after the expiration date.  NOTE: This sheet is a summary. It may not cover all possible information. If you have questions about this medicine, talk to your doctor, pharmacist, or health care provider.  © 2018 [employer]/Gold Standard (2017-09-06 16:29:33)      · Is patient discharged on Warfarin /  Coumadin?   No     Depression / Suicide Risk    As you are discharged from this [facility] facility, it is important to learn how to keep safe from harming yourself.    Recognize the warning signs:  · Abrupt changes in personality, positive or negative- including increase in energy   · Giving away possessions  · Change in eating patterns- significant weight changes-  positive or negative  · Change in sleeping patterns- unable to sleep or sleeping all the time   · Unwillingness or inability to communicate  · Depression  · Unusual sadness, discouragement and loneliness  · Talk of wanting to die  · Neglect of personal appearance   · Rebelliousness- reckless behavior  · Withdrawal from people/activities they love  · Confusion- inability to concentrate     If you or a loved one observes any of these behaviors or has concerns about self-harm, here's what you can do:  · Talk about it- your feelings and reasons for harming yourself  · Remove any means that you might use to hurt yourself (examples: pills, rope, extension cords, firearm)  · Get professional help from the community (Mental Health, Substance Abuse, psychological counseling)  · Do not be alone:Call your Safe Contact- someone whom you trust who will be there for you.  · Call your local CRISIS HOTLINE 928-6561 or 818-456-7630  · Call your local Children's Mobile Crisis Response Team [place] (323) 503-1741 or www.[employer]ptela  · Call the toll free National Suicide Prevention Hotlines   · National Suicide Prevention Lifeline 866-969-TMGM (3954)  · National Hope Line Network 800-SUICIDE (999-7096)

## 2019-11-09 NOTE — FLOWSHEET NOTE
11/09/19 0245   CPAP/BiPAP [facility]   Nocturnal CPAP or BiPAP BiPap   #System Evaluation Yes   Home Unit Used? Yes   Equipment Inspected for Cleanliness, Operation, and Safety? Yes   Settings (If Known) 18/11   FiO2 or LPM 4   Pt is currently not wearing CPAP/BiPap unit Yes   Home Mask Used? Yes

## 2019-11-09 NOTE — THERAPY
PT eval not completed, due to RN concerns of patient becoming dizzy and lightheaded with EOB mobility. Pt was not able to ambulate with RN due to activity tolerance concerns and O2 needs. Discussed with son and patient with current plan of care and to attempt therapy tomorrow due to poor mobility and tolerance with RN. Will re-attempt tomorrow.

## 2019-11-09 NOTE — PROGRESS NOTES
Call placed to Dr. Cabral' office re: pt inability to discharge today. Medical assistant updated re: pt inability to complete PT/OT due to lightheadedness; discharge anticipated tomorrow (11/9/19) after PT/OT re-evaluation.

## 2019-11-09 NOTE — PROGRESS NOTES
"Doing very well this morning.  Dressings clean dry and intact.  Normal neurovascular exam of the foot.  Block seems to be wearing off and sensation returning to the knee.    /60   Pulse 69   Temp 36.4 °C (97.5 °F) (Oral)   Resp 18   Ht 1.778 m (5' 10\")   Wt 89.7 kg (197 lb 12 oz)   SpO2 92%       Plan:  Weightbearing as tolerated  PT/OT eval this morning  Discharge home late morning  Follow-up with Dr. Cabral  "

## 2019-11-09 NOTE — PROGRESS NOTES
Bedside report received from night RN. Assumed care of patient. Daily plan of care discussed. Pt eager to discharge home today after PT/OT. Hourly rounding in place.

## 2019-11-09 NOTE — THERAPY
"Occupational Therapy Evaluation completed.   Functional Status:  Pt is an 87 y/o male, admit for a R TKA. Pt lives alone, has a daughter that will be staying with him , and a supportive son to assist. PLOF- I and active. Pt presents with minimal c/o pain with ADLS and functional mobility, was initially dizzy with activity, that resolved after therapy progressed. Pt required Supervision for ADLS and functional transfers. Pt and his son were educated regarding techniques for ADLS ands functional mobility, post surgery. Pt will be seen for initial evaluation and treatment only, as he is functional in this setting.   Plan of Care: Patient with no further skilled OT needs in the acute care setting at this time  Discharge Recommendations:  Equipment: No Equipment Needed. Post-acute therapy Discharge to home with outpatient or home health for additional skilled therapy services.    See \"Rehab Therapy-Acute\" Patient Summary Report for complete documentation.    "

## 2019-11-09 NOTE — PROGRESS NOTES
Pt discharged to home via personal vehicle with son. Discharge paperwork reviewed and signed. VSS. Pt escorted off unit via wheelchair with hospital staff.

## 2019-11-09 NOTE — PROGRESS NOTES
Pt AA&Ox4, denies any nausea/vomiting Pain in surgical site (right knee) is at comfortable level per pt. CMS intact. Surgical dressing clean, dry, intact w/ no drainage observed. Ice pack applied, SCDs in placed. VS WDL. Pt wears CPAP and on 4L O2 oxymask.  Safety precautions in place. Family at bedside. No other needs at this time.

## 2019-11-09 NOTE — CARE PLAN
Problem: Communication  Goal: The ability to communicate needs accurately and effectively will improve  Outcome: PROGRESSING AS EXPECTED   Pt able to communicate needs appropriately to staff. Plan of care discussed to pt. Questions and concerns answered. Pt. Verbalized understanding. Communication board updated.       Problem: Safety  Goal: Will remain free from injury  Outcome: PROGRESSING AS EXPECTED   Pt educated to call when in need. Call light and personal belongings w/n reach. Room free of clutters. Bed locked and in lowest position. Answer call light immediately. Hourly rounding.       Problem: Pain Management  Goal: Pain level will decrease to patient's comfort goal  Outcome: PROGRESSING AS EXPECTED   Patient medicated per MD orders. Encouraged the use of non-pharm measures of pain relief (i.e. cold packs, elevation) and  taught to inform RN if pain is greater than comfort level.

## 2019-11-09 NOTE — FLOWSHEET NOTE
11/08/19 2132   CPAP/BiPAP [facility]   Nocturnal CPAP or BiPAP BiPap   #System Evaluation Yes   Home Unit Used? Yes   Equipment Inspected for Cleanliness, Operation, and Safety? Yes   Settings (If Known) 18/11   FiO2 or LPM 4   Home Mask Used? Yes

## 2019-11-09 NOTE — THERAPY
"Physical Therapy Evaluation completed.   Bed Mobility:     Transfers: Sit to Stand: Supervised  Gait: Level Of Assist: Supervised with Front-Wheel Walker  X 100 feet and 3 stairs     Plan of Care: Patient with no further skilled PT needs in the acute care setting at this time  Discharge Recommendations: Equipment: No Equipment Needed. Post-acute therapy Discharge to home with outpatient or home health for additional skilled therapy services.  Pt is safe with transfers,ambulation and stairs,he understands HEP and has no equipment needs  See \"Rehab Therapy-Acute\" Patient Summary Report for complete documentation.     "

## 2019-12-03 ENCOUNTER — SLEEP CENTER VISIT (OUTPATIENT)
Dept: SLEEP MEDICINE | Facility: MEDICAL CENTER | Age: 84
End: 2019-12-03
Payer: MEDICARE

## 2019-12-03 VITALS
BODY MASS INDEX: 28.2 KG/M2 | HEART RATE: 83 BPM | RESPIRATION RATE: 15 BRPM | SYSTOLIC BLOOD PRESSURE: 110 MMHG | OXYGEN SATURATION: 94 % | DIASTOLIC BLOOD PRESSURE: 70 MMHG | WEIGHT: 197 LBS | HEIGHT: 70 IN

## 2019-12-03 DIAGNOSIS — G47.33 OSA (OBSTRUCTIVE SLEEP APNEA): ICD-10-CM

## 2019-12-03 DIAGNOSIS — G47.34 SLEEP RELATED HYPOXIA: ICD-10-CM

## 2019-12-03 PROCEDURE — 99213 OFFICE O/P EST LOW 20 MIN: CPT | Performed by: FAMILY MEDICINE

## 2019-12-03 NOTE — PROGRESS NOTES
[facility] Follow Up Note     Date: 12/3/2019 / Time: 2:09 PM    Patient ID:   Name:             Gumaro Garsia   YOB: 1931  Age:                 88 y.o.  male   MRN:               [id number]      Thank you for requesting a sleep medicine consultation on Gumaro Garsia at the sleep center. He presents today with the chief complaints of TERESA follow up. His initial Polysomnogram indicated an AHI of 51 with severe desaturations    HISTORY OF PRESENT ILLNESS:       Pt is currently on Bipap 16/11 cm with 4 LPM 02 bleed in. He goes to sleep around 11 pm and wakes up around 6 am. He is getting about 8 hrs of sleep on a good night and about 7 hr of sleep on a bad night. The bad nights are more common recently since he had a knee replacement  Last month. Overall,  He does feel rested upon awakening.He occasionally takes a nap.    He is using BiPAP most days of the week. Pt reports 8 hrs of average nightly use of BiPAP. Pt denies snoring, gasping,choking.Pt also denies significant mask leak that is interfering with sleep. The 30 day compliance was downloaded which shows adequate compliance with more that 4 hr usage about 93%. The AHI is has improved to 9.9/hr. The mask leak is normal. The symptoms of TERESA has improved with the BiPAP therapy.         SLEEP HISTORY   He underwent a dedicated in lab titration study on 4/26/2016 which indicates incomplete titration to CPAP. He did best on a Bilevel pressure of 18/13 CM with a resultant AHI of 3.5. He did require 02 bleed in at 2 LPM. Overnight oximetry 11/12/2016 on Bipap with 2 LPM 02 bleed in, indicated a mean 02 saturation of 89.4% with 168 minutes spent with saturations less than 88%      REVIEW OF SYSTEMS:       Constitutional: Denies fevers, Denies weight changes  Eyes: Denies changes in vision, no eye pain  Ears/Nose/Throat/Mouth: Denies nasal congestion or sore throat   Cardiovascular: Denies chest pain or palpitations   Respiratory:  Denies shortness of breath , Denies cough  Gastrointestinal/Hepatic: Denies abdominal pain, nausea, vomiting,  Musculoskeletal/Rheum: + left knee   Skin/Breast: Denies rash,   Neurological: Denies headache, confusion  Psychiatric: denies mood disorder   Sleep: denies snoring     Comprehensive review of systems form is reviewed with the patient and is attached in the EMR.     PMH:  has a past medical history of A-fib (HCC) (3-23-17), Arrhythmia, Arthritis (3-23-17), Atrial flutter (HCC) (1/4/2012), Cancer (HCC) (2007), Cancer (HCC) (2015), Cataract, Dry cough (10/26/2019), Heart burn (3-23-17), Heart murmur, High cholesterol (3-23-17), Lipids abnormal (1/4/2012), Obstructive sleep apnea (3-23-17), Pain, Pain (11/11/15), Pain (3-23-17), Post-nasal drip (3-23-17), Rotator cuff arthropathy, Unspecified disorder of thyroid, Urinary bladder disorder (3-23-17), and Urinary incontinence (3-23-17).  MEDS:   Current Outpatient Medications:   •  liothyronine (CYTOMEL) 5 MCG Tab, Take 5 mcg by mouth every day., Disp: , Rfl:   •  loratadine (CLARITIN) 10 MG Tab, Take 10 mg by mouth every day., Disp: , Rfl:   •  cetirizine (ZYRTEC) 10 MG Tab, Take 10 mg by mouth every day., Disp: , Rfl:   •  metoprolol SR (TOPROL XL) 100 MG TABLET SR 24 HR, Take 100 mg by mouth every evening., Disp: , Rfl:   •  acetaminophen (TYLENOL) 500 MG Tab, Take 500-1,000 mg by mouth Pre-Op Once., Disp: , Rfl:   •  levothyroxine (SYNTHROID) 100 MCG Tab, Take 100 mcg by mouth Every morning on an empty stomach., Disp: , Rfl:   •  testosterone cypionate (DEPO-TESTOSTERONE) 200 MG/ML Solution injection, 100 mg by Intramuscular route every 14 days., Disp: , Rfl:   •  rivaroxaban (XARELTO) 15 MG Tab tablet, Take 15 mg by mouth every bedtime. Atrial Fib, Disp: , Rfl:   •  Ipratropium Bromide (ATROVENT NA), Spray 2 Sprays in nose as needed., Disp: , Rfl:   •  famotidine (PEPCID) 40 MG Tab, Take 40 mg by mouth as needed., Disp: , Rfl:   •  tamsulosin (FLOMAX) 0.4 MG  capsule, Take 0.4 mg by mouth every evening., Disp: , Rfl:   •  Cholecalciferol (VITAMIN D3) 5000 UNITS Tab, Take 1 Tab by mouth every day., Disp: , Rfl:   •  Calcium Carbonate (CALCIUM 500 PO), Take 2 Tabs by mouth every day., Disp: , Rfl:   •  atorvastatin (LIPITOR) 40 MG TABS, Take 40 mg by mouth every evening., Disp: , Rfl:   •  trospium (SANCTURA) 20 MG TABS, Take 20 mg by mouth 2 Times a Day., Disp: , Rfl:   •  Multiple Vitamin (MULTI-VITAMIN PO), Take 1 Tab by mouth every evening., Disp: , Rfl:   ALLERGIES: No Known Allergies  SURGHX:   Past Surgical History:   Procedure Laterality Date   • PB TOTAL KNEE ARTHROPLASTY Right 11/8/2019    Procedure: ARTHROPLASTY, KNEE, TOTAL;  Surgeon: Clovis Cabral M.D.;  Location: [facility];  Service: Orthopedics   • HIP ARTH ANTERIOR TOTAL Left 4/6/2017    Procedure: HIP ARTHROPLASTY ANTERIOR TOTAL;  Surgeon: Froylan Gunter M.D.;  Location: [facility];  Service:    • KNEE ARTHROPLASTY TOTAL Left 11/24/2015    Procedure: KNEE ARTHROPLASTY TOTAL;  Surgeon: Clovis Cabral M.D.;  Location: [facility];  Service:    • SHOULDER ARTHROSCOPY W/ ROTATOR CUFF REPAIR  5/29/2013    Performed by Clovis Cabral M.D. at [facility]   • SHOULDER DECOMPRESSION ARTHROSCOPIC  5/29/2013    Performed by Clovis Cabral M.D. at [facility]   • LUMBAR DECOMPRESSION  4/14/2011    Performed by LUIS GREGORIO at [facility]   • LUMBAR LAMINECTOMY DISKECTOMY  4/14/2011    Performed by LUIS GREGORIO at [facility]   • RECOVERY  1/6/2011    Performed by SURGERY, CATH-RECOVERY at [facility]ERY [facility] DAY [facility]   • RECOVERY  12/9/2010    Performed by SURGERY, CATH-RECOVERY at [facility]ERY [facility]   • OTHER CARDIAC SURGERY  2000    ablation for Atrial Flutter   • LUMBAR FUSION POSTERIOR  1996    L2-4 FUSION   • INGUINAL HERNIA REPAIR Left 1965   • APPENDECTOMY  1952   • TONSILLECTOMY  1947   • CATARACT  "EXTRACTION WITH IOL      OU     SOCHX:  reports that he has never smoked. He has never used smokeless tobacco. He reports that he does not drink alcohol or use drugs..  FH:   Family History   Problem Relation Age of Onset   • Heart Attack Father 79   • Other Mother         Multiple myeloma   • Diabetes Other    • Cancer Other    • Heart Disease Other          Physical Exam:  Vitals/ General Appearance:   Weight/BMI: Body mass index is 28.27 kg/m².  /70 (BP Location: Left arm, Patient Position: Sitting, BP Cuff Size: Adult)   Pulse 83   Resp 15   Ht 1.778 m (5' 10\")   Wt 89.4 kg (197 lb)   SpO2 94%   Vitals:    12/03/19 1402   BP: 110/70   BP Location: Left arm   Patient Position: Sitting   BP Cuff Size: Adult   Pulse: 83   Resp: 15   SpO2: 94%   Weight: 89.4 kg (197 lb)   Height: 1.778 m (5' 10\")       Pt. is alert and oriented to time, place and person. Cooperative and in no apparent distress.       -Head: Atraumatic, normocephalic.   -. Ears: Normal tympanic membrane and no discharge  -. Nose: No inferior turbinate hypertophy  -. Throat: Oropharynx appears crowded in that the palate is overhanging   -. Neck: Supple. No thyromegaly  -. Chest: Trachea central, no spine deformity   -. Lungs auscultation: B/L good air entry, vesicular breath sounds, no adventitious sounds  -. Heart auscultation: 1st and 2nd heart sounds normal, regular rhythm. No appreciable murmur.  - Extremities: no pedal edema.  - Skin: No rash  - NEUROLOGICAL EXAMINATION: On neurological exam, the patient was alert and oriented x3. speech was clear and fluent without dysarthria.      ASSESSMENT AND PLAN     1. Sleep Apnea w/ hypoxia .    The pathophysiology of sleep anea and the increased risk of cardiovascular morbidity from untreated sleep apnea is discussed in detail with the patient. He  also has Afib which can be worsened by her TERESA.     He is urged to avoid supine sleep, weight gain and alcoholic beverages since all of these can " worsen sleep apnea. He is cautioned against drowsy driving. If He feels sleepy while driving, He must pull over for a break/nap, rather than persist on the road, in the interest of He own safety and that of others on the road.   Plan   - Changing pressure to Auto BiPAP EPAP 11 cm IPAP 19 cm PS 4 with O2 bleed in at 4Lmin due  to elevated AHI   - OPO is ordered with    - compliance download was reviewed and discussed with the pt   - compliance was reinforced     2. Regarding treatment of other past medical problems and general health maintenance,  He is urged to follow up with PCP.

## 2019-12-03 NOTE — PATIENT INSTRUCTIONS
"- Astelin spray for runny nose         CPAP and BIPAP Information  CPAP and BIPAP are methods of helping you breathe with the use of air pressure. CPAP stands for \"continuous positive airway pressure.\" BIPAP stands for \"bi-level positive airway pressure.\" In both methods, air is blown into your air passages to help keep you breathing well. With CPAP, the amount of pressure stays the same while you breathe in and out. CPAP is most commonly used for obstructive sleep apnea. For obstructive sleep apnea, CPAP works by holding your airways open so that they do not collapse when your muscles relax during sleep. BIPAP is similar to CPAP except the amount of pressure is increased when you inhale. This helps you take larger breaths. Your health care provider will recommend whether CPAP or BIPAP would be more helpful for you.   WHY ARE CPAP AND BIPAP TREATMENTS USED?  CPAP or BIPAP can be helpful if you have:   · Sleep apnea.    · Chronic obstructive pulmonary disease (COPD).    · Diseases that weaken the muscles of the chest, including muscular dystrophy or neurological diseases such as amyotrophic lateral sclerosis (ALS).  · Other problems that cause breathing to be weak, abnormal, or difficult.    HOW IS CPAP OR BIPAP ADMINISTERED?  Both CPAP and BIPAP are provided by a small machine with a flexible plastic tube that attaches to a plastic mask. The mask fits on your face, and air is blown into your air passages through your nose or mouth. The amount of pressure that is used to blow the air into your air passages can be set on the machine. Your health care provider will determine the pressure setting that should be used based on your individual needs.  WHEN SHOULD CPAP OR BIPAP BE USED?  In most cases, the mask is worn only when sleeping. Generally, you will need to wear the mask throughout the night and during the daytime if you take a nap. In a few cases involving certain medical conditions, people also need to wear the " mask at other times when they are awake. Follow your health care provider's instructions for when to use the machine.   USING THE MASK  · Because the mask needs to be snug, some people feel a trapped or closed-in feeling (claustrophobic) when first using the mask. You may need to get used to the mask gradually. To do this, you can first hold the mask loosely over your nose or mouth. Gradually apply the mask more snugly. You can also gradually increase the amount of time that you use the mask.  · Masks are available in various types and sizes. Some fit over your mouth and nose, and some fit over just your nose. If your mask does not fit well, talk to your health care provider about getting a different one.  · If you are using a nasal mask and you tend to breathe through your mouth, a chin strap may be applied to help keep your mouth closed.    · The CPAP and BIPAP machines have alarms that may sound if the mask comes off or develops a leak.  · If you have trouble with the mask, it is very important that you talk to your health care provider about finding a way to make the mask easier to tolerate. Do not stop using the mask. This could have a negative impact on your health.  TIPS FOR USING THE MACHINE  · Place your CPAP or BIPAP machine on a secure table or stand near an electrical outlet.    · Know where the on-off switch is located on the machine.  · Follow your health care provider's instructions for how to set the pressure on your machine and when you should use it.  · Do not eat or drink while the CPAP or BIPAP machine is on. Food or fluids could get pushed into your lungs by the pressure of the CPAP or BIPAP.  · Do not smoke. Tobacco smoke residue can damage the machine.    · For home use, CPAP and BIPAP machines can be rented or purchased through home health care companies. Many different brands of machines are available. Renting a machine before purchasing may help you find out which particular machine works  well for you.  SEEK IMMEDIATE MEDICAL CARE IF:  · You have redness or open areas around your nose or mouth where the mask fits.    · You have trouble operating the CPAP or BIPAP machine.    · You cannot tolerate wearing the CPAP or BIPAP mask.    This information is not intended to replace advice given to you by your health care provider. Make sure you discuss any questions you have with your health care provider.  Document Released: 09/15/2005 Document Revised: 01/08/2016 Document Reviewed: 07/17/2014  Elsevier Interactive Patient Education © 2017 [employer]

## 2019-12-06 ENCOUNTER — APPOINTMENT (OUTPATIENT)
Dept: SLEEP MEDICINE | Facility: MEDICAL CENTER | Age: 84
End: 2019-12-06
Attending: FAMILY MEDICINE
Payer: MEDICARE

## 2019-12-06 ENCOUNTER — TELEPHONE (OUTPATIENT)
Dept: SLEEP MEDICINE | Facility: MEDICAL CENTER | Age: 84
End: 2019-12-06

## 2019-12-06 DIAGNOSIS — G47.34 SLEEP RELATED HYPOXIA: ICD-10-CM

## 2019-12-06 DIAGNOSIS — G47.33 OSA (OBSTRUCTIVE SLEEP APNEA): ICD-10-CM

## 2019-12-06 NOTE — TELEPHONE ENCOUNTER
"Pt is wondering if the pressure may need to be adjusted, he feels as though they may be \"off\"    Compliance scanned in for review, pt will be in on Monday to drop off OPO and also make any changes to pressure if warranted.     Please advise.  "

## 2019-12-07 NOTE — TELEPHONE ENCOUNTER
Per Dr. Ibarra The pressure should have been changed to Auto BiPAP EPAP 11 cm IPAP 19 cm PS 4 but it seems like it's still the same as 14/11 cm. Can you please call the DME so it can be changed prior to his OPO tonight . Thank you      I attempted to contact [employer] because that is  [employer] company for the pt but the office is close.

## 2019-12-09 NOTE — TELEPHONE ENCOUNTER
Wrong pressure was done when patient came in today, also his OPO had no data so I will have to have him come back in.

## 2019-12-10 ENCOUNTER — HOME STUDY (OUTPATIENT)
Dept: SLEEP MEDICINE | Facility: MEDICAL CENTER | Age: 84
End: 2019-12-10
Attending: FAMILY MEDICINE
Payer: MEDICARE

## 2019-12-10 PROCEDURE — 94762 N-INVAS EAR/PLS OXIMTRY CONT: CPT | Performed by: FAMILY MEDICINE

## 2019-12-12 ENCOUNTER — TELEPHONE (OUTPATIENT)
Dept: SLEEP MEDICINE | Facility: MEDICAL CENTER | Age: 84
End: 2019-12-12

## 2019-12-12 DIAGNOSIS — G47.33 OSA (OBSTRUCTIVE SLEEP APNEA): ICD-10-CM

## 2019-12-12 DIAGNOSIS — R09.02 HYPOXIA: ICD-10-CM

## 2019-12-12 NOTE — PROCEDURES
Over Night Pulse Oximetry     Indication:To assess the efficacy of the current pressure.       Impression:   The study was done on Auto BiPAP EPAP 11 cm IPAP 19 cm PS 4 with O2 bleed in at 4LPM. The total analyzed time was 8 hrs 11 min. O2 Sat. jamie was 81% and mean O2 sat was 87 % and baseline O2 at 91%. O2 sat was below 88% for 57 min of the flow evaluation time. Oxygen Desaturation (>=3%) Index was elevated at 20/hr.      Recommendation:  He has residual nocturnal hypoxia. Consider increasing base EPAP pressure and O2 bleed in rate to 5L/min. Clinical correlation recommended.

## 2019-12-12 NOTE — TELEPHONE ENCOUNTER
Called pt regarding the results of the OPO he completed on 12/10/19. Per Dr. Ibarra Recommendation:  He has residual nocturnal hypoxia. Consider increasing base EPAP pressure and O2 bleed in rate to 5L/min. Clinical correlation recommended.        I also informed the pt that the order for the pressure change will be faxed to his [employer] company and I advise the pt to contact us if he has any questions or concerns.   OPO is scanned into media

## 2019-12-13 NOTE — TELEPHONE ENCOUNTER
It seems like the pressure on his BiPAP is still wrong at 16/11 cm. Please ask him to bring the BiPAp so it can be changed to  Auto BiPAP EPAP 11 cm IPAP 19 cm PS 4. Also please recommend him to increase the O2 to 5Lmin in mean time.

## 2019-12-13 NOTE — TELEPHONE ENCOUNTER
Patient called and he has been informed of results, patient would like to know if he should repeat OPO on new pressures and 5L .     Please Advise

## 2019-12-13 NOTE — TELEPHONE ENCOUNTER
----- Message from Ginette Ibrahim, Med Ass't sent at 12/12/2019  9:33 AM PST -----  Compliance report  is scanned into media.   ----- Message -----  From: Jenny Ibarra M.D.  Sent: 12/12/2019   8:52 AM PST  To: Ginette Ibrahim, Med Ass't    Please get the compliance download for him. Thank you.

## 2019-12-16 NOTE — TELEPHONE ENCOUNTER
Pt called to confirm the pressure on his machine was correct. I informed the pt that the pressure and O2 should be at Auto BiPAP EPAP 11 cm IPAP 19 cm PS 4 and 5L  And the pt stated that is the pressure and O2 is at.     I also schedule the pt for an OPO on 12/7/2020 but I don't see an order for it, Dr. Ibarra can you please sign the pending order.

## 2020-01-07 ENCOUNTER — HOME STUDY (OUTPATIENT)
Dept: SLEEP MEDICINE | Facility: MEDICAL CENTER | Age: 85
End: 2020-01-07
Attending: FAMILY MEDICINE
Payer: MEDICARE

## 2020-01-07 DIAGNOSIS — R09.02 HYPOXIA: ICD-10-CM

## 2020-01-07 DIAGNOSIS — G47.33 OSA (OBSTRUCTIVE SLEEP APNEA): ICD-10-CM

## 2020-01-07 PROCEDURE — 94762 N-INVAS EAR/PLS OXIMTRY CONT: CPT | Performed by: FAMILY MEDICINE

## 2020-01-09 ENCOUNTER — HOSPITAL ENCOUNTER (OUTPATIENT)
Dept: RADIOLOGY | Facility: MEDICAL CENTER | Age: 85
End: 2020-01-09
Attending: OTOLARYNGOLOGY
Payer: MEDICARE

## 2020-01-09 DIAGNOSIS — G96.00 CEREBROSPINAL FLUID LEAK: ICD-10-CM

## 2020-01-09 PROCEDURE — 70486 CT MAXILLOFACIAL W/O DYE: CPT

## 2020-01-09 NOTE — PROCEDURES
Over Night Pulse Oximetry     Indication:To assess the efficacy of the current pressure .       Impression:   The study was done on BiPAP 19/11 cm. The total analyzed time was 7 hrs 0 min. O2 Sat. jamie was 88% and mean O2 sat was 91 % and baseline O2 at 94%. O2 sat was below 88% for 0 min of the flow evaluation time. Oxygen Desaturation (>=3%) Index was 4/hr.      Recommendation:  Unremarkable oximetry, continue BiPAP at current pressure. Clinical correlation recommended.

## 2020-01-10 ENCOUNTER — TELEPHONE (OUTPATIENT)
Dept: SLEEP MEDICINE | Facility: MEDICAL CENTER | Age: 85
End: 2020-01-10

## 2020-01-10 NOTE — TELEPHONE ENCOUNTER
Called pt regarding the results of the OPO he completed. Per Dr. Ibarra Recommendation:  Unremarkable oximetry, continue BiPAP at current pressure. Clinical correlation recommended.        Pt understood the results. OPO is scan into media.

## 2020-01-31 ENCOUNTER — NON-PROVIDER VISIT (OUTPATIENT)
Dept: CARDIOLOGY | Facility: MEDICAL CENTER | Age: 85
End: 2020-01-31
Payer: MEDICARE

## 2020-01-31 DIAGNOSIS — I48.19 PERSISTENT ATRIAL FIBRILLATION (HCC): ICD-10-CM

## 2020-01-31 DIAGNOSIS — I49.3 MULTIFOCAL PVCS: ICD-10-CM

## 2020-01-31 PROCEDURE — 93224 XTRNL ECG REC UP TO 48 HRS: CPT | Performed by: INTERNAL MEDICINE

## 2020-02-03 DIAGNOSIS — I48.19 PERSISTENT ATRIAL FIBRILLATION (HCC): ICD-10-CM

## 2020-02-03 LAB — EKG IMPRESSION: NORMAL

## 2020-02-03 NOTE — PROGRESS NOTES
need FUTURE holter order only   Received: Today   Message Contents   Elizabeth Baker, Med Ass't  EILEEN DialN.             No ADD 2/3     BE has agreed to read.     rFank Unger DO has ordered 24 hour holter, dx:chronic a fib

## 2020-03-16 ENCOUNTER — TELEPHONE (OUTPATIENT)
Dept: HEALTH INFORMATION MANAGEMENT | Facility: OTHER | Age: 85
End: 2020-03-16

## 2020-03-16 NOTE — TELEPHONE ENCOUNTER
Pt called r/t pending pulmonary follow up for CPAP.  Pt is NOT sick, has no s/s that would prevent him from keeping his appt.    Returned to scheduling to ensure date/time of appt.

## 2020-03-17 ENCOUNTER — SLEEP CENTER VISIT (OUTPATIENT)
Dept: SLEEP MEDICINE | Facility: MEDICAL CENTER | Age: 85
End: 2020-03-17
Payer: MEDICARE

## 2020-03-17 ENCOUNTER — APPOINTMENT (OUTPATIENT)
Dept: SLEEP MEDICINE | Facility: MEDICAL CENTER | Age: 85
End: 2020-03-17
Payer: MEDICARE

## 2020-03-17 VITALS
HEIGHT: 70 IN | DIASTOLIC BLOOD PRESSURE: 70 MMHG | WEIGHT: 203 LBS | HEART RATE: 84 BPM | SYSTOLIC BLOOD PRESSURE: 136 MMHG | BODY MASS INDEX: 29.06 KG/M2 | RESPIRATION RATE: 16 BRPM | OXYGEN SATURATION: 91 %

## 2020-03-17 DIAGNOSIS — I48.91 ATRIAL FIBRILLATION, UNSPECIFIED TYPE (HCC): ICD-10-CM

## 2020-03-17 DIAGNOSIS — G47.33 OBSTRUCTIVE SLEEP APNEA: ICD-10-CM

## 2020-03-17 DIAGNOSIS — Z78.9 NONSMOKER: ICD-10-CM

## 2020-03-17 PROCEDURE — 99214 OFFICE O/P EST MOD 30 MIN: CPT | Performed by: NURSE PRACTITIONER

## 2020-03-17 NOTE — PROGRESS NOTES
Chief Complaint   Patient presents with   • Apnea     Last Seen 12/3/19        HPI:  Gumaro Garsia is a 89 y.o. year old male here today for follow-up on TERESA.  Last office visit 12/3/2019 with Dr. Ibarra.  History of A. fib and on chronic anticoagulation.    His initial PSG indicated severe sleep apnea with an AHI 51/h with significant oxygen desaturations.  Titration study 4/26/2016 indicated incomplete titration on CPAP and BiPAP was tried.  He was started on BiPAP 18/13 cm with 2 L oxygen bleed in.  CNOX 11/12/2016 indicated a mean SPO2 of 89.4% and less than 88% for 168 minutes.  Patient's pressures were empirically adjusted last office visit.  He was using BiPAP 19/11 cm with 2 L oxygen bleed in at that time.  Recommendation was to adjust settings to auto BiPAP EPAP 11 cm, IPAP 19 cm, PS 4 with 5 L oxygen bleed in.  In review of compliance report adjustments were not made.  Compliance report 2/16/2020 through 3/16/2020 notes perhaps a compliance, average nightly use of 7 hours 40 minutes, no significant mask leak with an overall AHI of 11.4/hour.  Prior download at last office visit indicated an overall AHI of 9.9/h.  He underwent updated overnight oximetry 1/7/2020 noting a mean SPO2 of 94.1% with no oxygen desaturations.  A. fib was noted on pulse rate.  I reviewed all findings with patient.  He notes sleeping between 11 PM and 6 AM.  He will have some fatigue during the day to [place] to have activity he is doing.  He may doze off for half an hour in his chair.  We reviewed sleep hygiene and the need of avoiding naps.  He does occasionally take melatonin but feels or may not be any subjective benefit.  He is not taking it on a regular basis to have a fair determination.  He is amenable to pressure adjustment to see if this further improves his apnea.  He tolerates mask and pressure well.  He denies morning headaches.  Today he denies cardiac or respiratory symptoms.  No pedal edema.    ROS: As per HPI and  "otherwise negative if not stated.    Past Medical History:   Diagnosis Date   • A-fib (HCC) 3-23-17   • Arrhythmia     ATRIAL FLUTTER; cardiologist, Dr. Unger ([facility])   • Arthritis 3-23-17    \"Everywhere\"   • Atrial flutter (HCC) 1/4/2012    S/p ablation    • Cancer (HCC) 2007    PROSTATE TREATED WITH RADIATION    • Cancer (HCC) 2015    SKIN TREATED WITH MOHS   • Cataract     IOL OU   • Dry cough 10/26/2019    resolved   • Heart burn 3-23-17    Takes PEPCID   • Heart murmur     \"slight\"   • High cholesterol 3-23-17    Controlled with medication   • Lipids abnormal 1/4/2012   • Obstructive sleep apnea 3-23-17    CPAP USE &  Oxygen concentrator 4L nightly   • Pain     right shoulder   • Pain 11/11/15    left knee right hip   • Pain 3-23-17    \"Left Hip & Right Shoulder\"   • Post-nasal drip 3-23-17   • Rotator cuff arthropathy    • Unspecified disorder of thyroid     LOW THYROID   • Urinary bladder disorder 3-23-17    Takes Sanctura; bladder urgency   • Urinary incontinence 3-23-17       Past Surgical History:   Procedure Laterality Date   • PB TOTAL KNEE ARTHROPLASTY Right 11/8/2019    Procedure: ARTHROPLASTY, KNEE, TOTAL;  Surgeon: Clovis Cabral M.D.;  Location: [facility];  Service: Orthopedics   • HIP ARTH ANTERIOR TOTAL Left 4/6/2017    Procedure: HIP ARTHROPLASTY ANTERIOR TOTAL;  Surgeon: Froylan Gunter M.D.;  Location: [facility];  Service:    • KNEE ARTHROPLASTY TOTAL Left 11/24/2015    Procedure: KNEE ARTHROPLASTY TOTAL;  Surgeon: Clovis Cabral M.D.;  Location: [facility];  Service:    • SHOULDER ARTHROSCOPY W/ ROTATOR CUFF REPAIR  5/29/2013    Performed by Clovis Cabral M.D. at [facility]   • SHOULDER DECOMPRESSION ARTHROSCOPIC  5/29/2013    Performed by Clovis Cabral M.D. at [facility]   • LUMBAR DECOMPRESSION  4/14/2011    Performed by LUIS GREGORIO at [facility]   • LUMBAR LAMINECTOMY DISKECTOMY  4/14/2011    " Performed by LUIS GREGORIO at SURGERY [facility]S   • RECOVERY  1/6/2011    Performed by SURGERY[facility]   • RECOVERY  12/9/2010    Performed by SURGERY[facility]   • OTHER CARDIAC SURGERY  2000    ablation for Atrial Flutter   • LUMBAR FUSION POSTERIOR  1996    L2-4 FUSION   • INGUINAL HERNIA REPAIR Left 1965   • APPENDECTOMY  1952   • TONSILLECTOMY  1947   • CATARACT EXTRACTION WITH IOL      OU       Family History   Problem Relation Age of Onset   • Heart Attack Father 79   • Other Mother         Multiple myeloma   • Diabetes Other    • Cancer Other    • Heart Disease Other        Social History     Socioeconomic History   • Marital status:      Spouse name: Not on file   • Number of children: Not on file   • Years of education: Not on file   • Highest education level: Not on file   Occupational History   • Not on file   Social Needs   • Financial resource strain: Not on file   • Food insecurity     Worry: Not on file     Inability: Not on file   • Transportation needs     Medical: Not on file     Non-medical: Not on file   Tobacco Use   • Smoking status: Never Smoker   • Smokeless tobacco: Never Used   Substance and Sexual Activity   • Alcohol use: No     Alcohol/week: 0.0 oz   • Drug use: No   • Sexual activity: Not on file   Lifestyle   • Physical activity     Days per week: Not on file     Minutes per session: Not on file   • Stress: Not on file   Relationships   • Social connections     Talks on phone: Not on file     Gets together: Not on file     Attends Druze service: Not on file     Active member of club or organization: Not on file     Attends meetings of clubs or organizations: Not on file     Relationship status: Not on file   • Intimate partner violence     Fear of current or ex partner: Not on file     Emotionally abused: Not on file     Physically abused: Not on file     Forced sexual activity: Not on  "file   Other Topics Concern   • Not on file   Social History Narrative   • Not on file       Allergies as of 03/17/2020   • (No Known Allergies)        Vitals:  /70 (BP Location: Left arm, Patient Position: Sitting, BP Cuff Size: Adult)   Pulse 84   Resp 16   Ht 1.778 m (5' 10\")   Wt 92.1 kg (203 lb)   SpO2 91%     Current medications as of today   Current Outpatient Medications   Medication Sig Dispense Refill   • liothyronine (CYTOMEL) 5 MCG Tab Take 5 mcg by mouth every day.     • loratadine (CLARITIN) 10 MG Tab Take 10 mg by mouth every day.     • cetirizine (ZYRTEC) 10 MG Tab Take 10 mg by mouth every day.     • metoprolol SR (TOPROL XL) 100 MG TABLET SR 24 HR Take 100 mg by mouth every evening.     • acetaminophen (TYLENOL) 500 MG Tab Take 500-1,000 mg by mouth Pre-Op Once.     • levothyroxine (SYNTHROID) 100 MCG Tab Take 100 mcg by mouth Every morning on an empty stomach.     • testosterone cypionate (DEPO-TESTOSTERONE) 200 MG/ML Solution injection 100 mg by Intramuscular route every 14 days.     • rivaroxaban (XARELTO) 15 MG Tab tablet Take 15 mg by mouth every bedtime. Atrial Fib     • Ipratropium Bromide (ATROVENT NA) Spray 2 Sprays in nose as needed.     • famotidine (PEPCID) 40 MG Tab Take 40 mg by mouth as needed.     • tamsulosin (FLOMAX) 0.4 MG capsule Take 0.4 mg by mouth every evening.     • Cholecalciferol (VITAMIN D3) 5000 UNITS Tab Take 1 Tab by mouth every day.     • Calcium Carbonate (CALCIUM 500 PO) Take 2 Tabs by mouth every day.     • atorvastatin (LIPITOR) 40 MG TABS Take 40 mg by mouth every evening.     • trospium (SANCTURA) 20 MG TABS Take 20 mg by mouth 2 Times a Day.     • Multiple Vitamin (MULTI-VITAMIN PO) Take 1 Tab by mouth every evening.       No current facility-administered medications for this visit.          Physical Exam:   Gen:           Alert and oriented, No apparent distress. Mood and affect appropriate, normal interaction with examiner.  Eyes:          PERRL, " EOM intact, sclere white, conjunctive moist. Glasses.  Ears:          Not examined.   Hearing:     Grossly intact.  Nose:          Normal, no lesions or deformities.  Dentition:    Good dentition.  Oropharynx:   Tongue normal, posterior pharynx without erythema or exudate.  Mallampati Classification: not examined.  Neck:        Supple, trachea midline, no masses.  Respiratory Effort: No intercostal retractions or use of accessory muscles.   Lung Auscultation:      Clear to auscultation bilaterally; no rales, rhonchi or wheezing.  CV:            Regular rate and rhythm. No murmurs, rubs or gallops.  Abd:           Not examined.   Lymphadenopathy: Not examined.  Gait and Station: Normal.  Digits and Nails: No clubbing, cyanosis, petechiae, or nodes.   Cranial Nerves: II-XII grossly intact.  Skin:        No rashes, lesions or ulcers noted.               Ext:           No cyanosis or edema.      Assessment:  1. Obstructive sleep apnea     2. Atrial fibrillation, unspecified type (HCC)     3. BMI 29.0-29.9,adult     4. Nonsmoker         Immunizations:    Flu:recommend  Pneumovax 23:2010  Prevnar 13:recommend    Plan:  1.  Overall patient sleep apnea is clinically stable but we will further optimize therapy.  BiPAP adjusted in office to auto BiPAP IPAP 19, EPAP 11 with PS 4 and he will continue 5 L oxygen bleed in.  DME mask/supplies.  2.  Discussed sleep hygiene.  May use melatonin 30 minutes prior to bedtime consistently to see if subjective results are achieved.  Advised to have a consistent bedtime and wake schedule.  Avoidance of naps discussed.  3.  Follow-up with primary care for the health concerns.  4.  Follow-up in 3 to 4 months to check compliance/symptoms, sooner if needed.    Please note that this dictation was created using voice recognition software. I have made every reasonable attempt to correct obvious errors, but it is possible there are errors of grammar and possibly content that I did not discover  before finalizing the note.

## 2020-03-17 NOTE — LETTER
GABRIEL Loco  [facility]   [address] [place] [address]  Phone: [phone] - Fax: [phone]           Encounter Date: 3/17/2020  Provider: GABRIEL Loco  Location of Care: [facility] SLE[facility]      Patient:   Gumaro Garsia   MR Number: [id number]   YOB: 1931     PROGRESS NOTE:  Chief Complaint   Patient presents with   • Apnea     Last Seen 12/3/19        HPI:  Gumaro Garsia is a 89 y.o. year old male here today for follow-up on TERESA.  Last office visit 12/3/2019 with Dr. Ibarra.  History of A. fib and on chronic anticoagulation.    His initial PSG indicated severe sleep apnea with an AHI 51/h with significant oxygen desaturations.  Titration study 4/26/2016 indicated incomplete titration on CPAP and BiPAP was tried.  He was started on BiPAP 18/13 cm with 2 L oxygen bleed in.  CNOX 11/12/2016 indicated a mean SPO2 of 89.4% and less than 88% for 168 minutes.  Patient's pressures were empirically adjusted last office visit.  He was using BiPAP 19/11 cm with 2 L oxygen bleed in at that time.  Recommendation was to adjust settings to auto BiPAP EPAP 11 cm, IPAP 19 cm, PS 4 with 5 L oxygen bleed in.  In review of compliance report adjustments were not made.  Compliance report 2/16/2020 through 3/16/2020 notes perhaps a compliance, average nightly use of 7 hours 40 minutes, no significant mask leak with an overall AHI of 11.4/hour.  Prior download at last office visit indicated an overall AHI of 9.9/h.  He underwent updated overnight oximetry 1/7/2020 noting a mean SPO2 of 94.1% with no oxygen desaturations.  A. fib was noted on pulse rate.  I reviewed all findings with patient.  He notes sleeping between 11 PM and 6 AM.  He will have some fatigue during the day to [place] to have activity he is doing.  He may doze off for half an hour in his chair.  We reviewed sleep hygiene and the need of  "avoiding naps.  He does occasionally take melatonin but feels or may not be any subjective benefit.  He is not taking it on a regular basis to have a fair determination.  He is amenable to pressure adjustment to see if this further improves his apnea.  He tolerates mask and pressure well.  He denies morning headaches.  Today he denies cardiac or respiratory symptoms.  No pedal edema.    ROS: As per HPI and otherwise negative if not stated.    Past Medical History:   Diagnosis Date   • A-fib ([facility]) 3-23-17   • Arrhythmia     ATRIAL FLUTTER; cardiologist, Dr. Unger ([facility])   • Arthritis 3-23-17    \"Everywhere\"   • Atrial flutter ([facility]) 1/4/2012    S/p ablation    • Cancer ([facility]) 2007    PROSTATE TREATED WITH RADIATION    • Cancer ([facility]) 2015    SKIN TREATED WITH MOHS   • Cataract     IOL OU   • Dry cough 10/26/2019    resolved   • Heart burn 3-23-17    Takes PEPCID   • Heart murmur     \"slight\"   • High cholesterol 3-23-17    Controlled with medication   • Lipids abnormal 1/4/2012   • Obstructive sleep apnea 3-23-17    CPAP USE &  Oxygen concentrator 4L nightly   • Pain     right shoulder   • Pain 11/11/15    left knee right hip   • Pain 3-23-17    \"Left Hip & Right Shoulder\"   • Post-nasal drip 3-23-17   • Rotator cuff arthropathy    • Unspecified disorder of thyroid     LOW THYROID   • Urinary bladder disorder 3-23-17    Takes Sanctura; bladder urgency   • Urinary incontinence 3-23-17       Past Surgical History:   Procedure Laterality Date   • PB TOTAL KNEE ARTHROPLASTY Right 11/8/2019    Procedure: ARTHROPLASTY, KNEE, TOTAL;  Surgeon: Clovis Cabral M.D.;  Location: [facility];  Service: Orthopedics   • HIP ARTH ANTERIOR TOTAL Left 4/6/2017    Procedure: HIP ARTHROPLASTY ANTERIOR TOTAL;  Surgeon: Froylan Gunter M.D.;  Location: [facility];  Service:    • KNEE ARTHROPLASTY TOTAL Left 11/24/2015    Procedure: KNEE ARTHROPLASTY TOTAL;  Surgeon: Clovis Cabral M.D.;  Location: [facility];  Service:    • SHOULDER ARTHROSCOPY W/ ROTATOR CUFF REPAIR  5/29/2013    Performed by Clovis Cabral M.D. at SURGERY [facility]   • SHOULDER DECOMPRESSION ARTHROSCOPIC  5/29/2013    Performed by Clovis Cabral M.D. at SURGERY [facility]   • LUMBAR DECOMPRESSION  4/14/2011    Performed by LUIS GREGORIO at SURGERY [facility]   • LUMBAR LAMINECTOMY DISKECTOMY  4/14/2011    Performed by LUIS GREGORIO at SURGERY [facility]   • RECOVERY  1/6/2011    Performed by SURGERY, CATH-RECOVERY at SURGERY [facility] DAY [facility]S   • RECOVERY  12/9/2010    Performed by SURGERY, CATH-RECOVERY at SURGERY SAME DAY [facility]   • OTHER CARDIAC SURGERY  2000    ablation for Atrial Flutter   • LUMBAR FUSION POSTERIOR  1996    L2-4 FUSION   • INGUINAL HERNIA REPAIR Left 1965   • APPENDECTOMY  1952   • TONSILLECTOMY  1947   • CATARACT EXTRACTION WITH IOL      OU       Family History   Problem Relation Age of Onset   • Heart Attack Father 79   • Other Mother         Multiple myeloma   • Diabetes Other    • Cancer Other    • Heart Disease Other        Social History     Socioeconomic History   • Marital status:      Spouse name: Not on file   • Number of children: Not on file   • Years of education: Not on file   • Highest education level: Not on file   Occupational History   • Not on file   Social Needs   • Financial resource strain: Not on file   • Food insecurity     Worry: Not on file     Inability: Not on file   • Transportation needs     Medical: Not on file     Non-medical: Not on file   Tobacco Use   • Smoking status: Never Smoker   • Smokeless tobacco: Never Used   Substance and Sexual Activity   • Alcohol use: No     Alcohol/week: 0.0 oz   • Drug use: No   • Sexual activity: Not on file   Lifestyle   • Physical activity     Days per week: Not on file     Minutes per session: Not on file   • Stress: Not on file   Relationships   • Social connections     Talks on phone: Not on file     Gets  "together: Not on file     Attends Gnosticism service: Not on file     Active member of club or organization: Not on file     Attends meetings of clubs or organizations: Not on file     Relationship status: Not on file   • Intimate partner violence     Fear of current or ex partner: Not on file     Emotionally abused: Not on file     Physically abused: Not on file     Forced sexual activity: Not on file   Other Topics Concern   • Not on file   Social History Narrative   • Not on file       Allergies as of 03/17/2020   • (No Known Allergies)        Vitals:  /70 (BP Location: Left arm, Patient Position: Sitting, BP Cuff Size: Adult)   Pulse 84   Resp 16   Ht 1.778 m (5' 10\")   Wt 92.1 kg (203 lb)   SpO2 91%     Current medications as of today   Current Outpatient Medications   Medication Sig Dispense Refill   • liothyronine (CYTOMEL) 5 MCG Tab Take 5 mcg by mouth every day.     • loratadine (CLARITIN) 10 MG Tab Take 10 mg by mouth every day.     • cetirizine (ZYRTEC) 10 MG Tab Take 10 mg by mouth every day.     • metoprolol SR (TOPROL XL) 100 MG TABLET SR 24 HR Take 100 mg by mouth every evening.     • acetaminophen (TYLENOL) 500 MG Tab Take 500-1,000 mg by mouth Pre-Op Once.     • levothyroxine (SYNTHROID) 100 MCG Tab Take 100 mcg by mouth Every morning on an empty stomach.     • testosterone cypionate (DEPO-TESTOSTERONE) 200 MG/ML Solution injection 100 mg by Intramuscular route every 14 days.     • rivaroxaban (XARELTO) 15 MG Tab tablet Take 15 mg by mouth every bedtime. Atrial Fib     • Ipratropium Bromide (ATROVENT NA) Spray 2 Sprays in nose as needed.     • famotidine (PEPCID) 40 MG Tab Take 40 mg by mouth as needed.     • tamsulosin (FLOMAX) 0.4 MG capsule Take 0.4 mg by mouth every evening.     • Cholecalciferol (VITAMIN D3) 5000 UNITS Tab Take 1 Tab by mouth every day.     • Calcium Carbonate (CALCIUM 500 PO) Take 2 Tabs by mouth every day.     • atorvastatin (LIPITOR) 40 MG TABS Take 40 mg by mouth " every evening.     • trospium (SANCTURA) 20 MG TABS Take 20 mg by mouth 2 Times a Day.     • Multiple Vitamin (MULTI-VITAMIN PO) Take 1 Tab by mouth every evening.       No current facility-administered medications for this visit.          Physical Exam:   Gen:           Alert and oriented, No apparent distress. Mood and affect appropriate, normal interaction with examiner.  Eyes:          PERRL, EOM intact, sclere white, conjunctive moist. Glasses.  Ears:          Not examined.   Hearing:     Grossly intact.  Nose:          Normal, no lesions or deformities.  Dentition:    Good dentition.  Oropharynx:   Tongue normal, posterior pharynx without erythema or exudate.  Mallampati Classification: not examined.  Neck:        Supple, trachea midline, no masses.  Respiratory Effort: No intercostal retractions or use of accessory muscles.   Lung Auscultation:      Clear to auscultation bilaterally; no rales, rhonchi or wheezing.  CV:            Regular rate and rhythm. No murmurs, rubs or gallops.  Abd:           Not examined.   Lymphadenopathy: Not examined.  Gait and Station: Normal.  Digits and Nails: No clubbing, cyanosis, petechiae, or nodes.   Cranial Nerves: II-XII grossly intact.  Skin:        No rashes, lesions or ulcers noted.               Ext:           No cyanosis or edema.      Assessment:  1. Obstructive sleep apnea     2. Atrial fibrillation, unspecified type (HCC)     3. BMI 29.0-29.9,adult     4. Nonsmoker         Immunizations:    Flu:recommend  Pneumovax 23:2010  Prevnar 13:recommend    Plan:  1.  Overall patient sleep apnea is clinically stable but we will further optimize therapy.  BiPAP adjusted in office to auto BiPAP IPAP 19, EPAP 11 with PS 4 and he will continue 5 L oxygen bleed in.  DME mask/supplies.  2.  Discussed sleep hygiene.  May use melatonin 30 minutes prior to bedtime consistently to see if subjective results are achieved.  Advised to have a consistent bedtime and wake schedule.   Avoidance of naps discussed.  3.  Follow-up with primary care for the health concerns.  4.  Follow-up in 3 to 4 months to check compliance/symptoms, sooner if needed.    Please note that this dictation was created using voice recognition software. I have made every reasonable attempt to correct obvious errors, but it is possible there are errors of grammar and possibly content that I did not discover before finalizing the note.      Electronically signed by GABRIEL Loco  on 03/17/20    Frank Salinas M.D.  [address] [address]  VIA Facsimile: [phone]

## 2020-03-20 ENCOUNTER — TELEPHONE (OUTPATIENT)
Dept: SLEEP MEDICINE | Facility: MEDICAL CENTER | Age: 85
End: 2020-03-20

## 2020-09-11 ENCOUNTER — APPOINTMENT (OUTPATIENT)
Dept: RADIOLOGY | Facility: IMAGING CENTER | Age: 85
End: 2020-09-11
Attending: FAMILY MEDICINE
Payer: MEDICARE

## 2020-09-11 ENCOUNTER — OFFICE VISIT (OUTPATIENT)
Dept: URGENT CARE | Facility: CLINIC | Age: 85
End: 2020-09-11
Payer: MEDICARE

## 2020-09-11 VITALS
OXYGEN SATURATION: 93 % | TEMPERATURE: 97.3 F | HEIGHT: 70 IN | HEART RATE: 82 BPM | DIASTOLIC BLOOD PRESSURE: 76 MMHG | BODY MASS INDEX: 28.92 KG/M2 | SYSTOLIC BLOOD PRESSURE: 118 MMHG | RESPIRATION RATE: 16 BRPM | WEIGHT: 202 LBS

## 2020-09-11 DIAGNOSIS — S29.9XXA RIB INJURY: ICD-10-CM

## 2020-09-11 PROCEDURE — 71101 X-RAY EXAM UNILAT RIBS/CHEST: CPT | Mod: TC,RT | Performed by: FAMILY MEDICINE

## 2020-09-11 PROCEDURE — 99214 OFFICE O/P EST MOD 30 MIN: CPT | Performed by: FAMILY MEDICINE

## 2020-09-11 ASSESSMENT — ENCOUNTER SYMPTOMS
SHORTNESS OF BREATH: 0
FEVER: 0
SORE THROAT: 0
VOMITING: 0

## 2020-09-11 NOTE — PROGRESS NOTES
Subjective:     Gumaro Garsia is a 89 y.o. male who presents for Rib Pain (x 5 days, Pain on Rt. side of Ribs after fall, pain worse with deep breaths)    HPI  Pt presents for evaluation of a new problem   Pt with a mechanical fall 5 days ago   Fell forward onto the ground   Pain is more in the mid and lateral ribs   Pain is constant and aching   Has a sharp pain when laying on that side or taking a deep breath   No difficulties breathing  Has a chronic cough which is not worse lately, however rib pain does become worse with cough    Review of Systems   Constitutional: Negative for fever.   HENT: Negative for sore throat.    Respiratory: Negative for shortness of breath.    Cardiovascular: Positive for chest pain (Rib pain).   Gastrointestinal: Negative for vomiting.   Skin: Negative for rash.     PMH:  has a past medical history of A-fib ([facility]) (3-23-17), Arrhythmia, Arthritis (3-23-17), Atrial flutter ([facility]) (1/4/2012), Cancer ([facility]) (2007), Cancer ([facility]) (2015), Cataract, Dry cough (10/26/2019), Heart burn (3-23-17), Heart murmur, High cholesterol (3-23-17), Lipids abnormal (1/4/2012), Obstructive sleep apnea (3-23-17), Pain, Pain (11/11/15), Pain (3-23-17), Post-nasal drip (3-23-17), Rotator cuff arthropathy, Unspecified disorder of thyroid, Urinary bladder disorder (3-23-17), and Urinary incontinence (3-23-17).  MEDS:   Current Outpatient Medications:   •  liothyronine (CYTOMEL) 5 MCG Tab, Take 5 mcg by mouth every day., Disp: , Rfl:   •  metoprolol SR (TOPROL XL) 100 MG TABLET SR 24 HR, Take 100 mg by mouth every evening., Disp: , Rfl:   •  acetaminophen (TYLENOL) 500 MG Tab, Take 500-1,000 mg by mouth Pre-Op Once., Disp: , Rfl:   •  levothyroxine (SYNTHROID) 100 MCG Tab, Take 100 mcg by mouth Every morning on an empty stomach., Disp: , Rfl:   •  testosterone cypionate (DEPO-TESTOSTERONE) 200 MG/ML Solution injection, 100 mg by Intramuscular route every 14 days., Disp: , Rfl:   •  rivaroxaban (XARELTO) 15  MG Tab tablet, Take 15 mg by mouth every bedtime. Atrial Fib, Disp: , Rfl:   •  Ipratropium Bromide (ATROVENT NA), Spray 2 Sprays in nose as needed., Disp: , Rfl:   •  famotidine (PEPCID) 40 MG Tab, Take 40 mg by mouth as needed., Disp: , Rfl:   •  tamsulosin (FLOMAX) 0.4 MG capsule, Take 0.4 mg by mouth every evening., Disp: , Rfl:   •  Cholecalciferol (VITAMIN D3) 5000 UNITS Tab, Take 1 Tab by mouth every day., Disp: , Rfl:   •  Calcium Carbonate (CALCIUM 500 PO), Take 2 Tabs by mouth every day., Disp: , Rfl:   •  atorvastatin (LIPITOR) 40 MG TABS, Take 40 mg by mouth every evening., Disp: , Rfl:   •  trospium (SANCTURA) 20 MG TABS, Take 20 mg by mouth 2 Times a Day., Disp: , Rfl:   •  Multiple Vitamin (MULTI-VITAMIN PO), Take 1 Tab by mouth every evening., Disp: , Rfl:   •  loratadine (CLARITIN) 10 MG Tab, Take 10 mg by mouth every day., Disp: , Rfl:   •  cetirizine (ZYRTEC) 10 MG Tab, Take 10 mg by mouth every day., Disp: , Rfl:   ALLERGIES: No Known Allergies  SURGHX:   Past Surgical History:   Procedure Laterality Date   • PB TOTAL KNEE ARTHROPLASTY Right 11/8/2019    Procedure: ARTHROPLASTY, KNEE, TOTAL;  Surgeon: Clovis Cabral M.D.;  Location: [facility];  Service: Orthopedics   • HIP ARTH ANTERIOR TOTAL Left 4/6/2017    Procedure: HIP ARTHROPLASTY ANTERIOR TOTAL;  Surgeon: Froylan Gunter M.D.;  Location: [facility];  Service:    • KNEE ARTHROPLASTY TOTAL Left 11/24/2015    Procedure: KNEE ARTHROPLASTY TOTAL;  Surgeon: Clovis Cabral M.D.;  Location: [facility];  Service:    • SHOULDER ARTHROSCOPY W/ ROTATOR CUFF REPAIR  5/29/2013    Performed by Clovis Cabral M.D. at [facility]   • SHOULDER DECOMPRESSION ARTHROSCOPIC  5/29/2013    Performed by Clovis Cabral M.D. at [facility]   • LUMBAR DECOMPRESSION  4/14/2011    Performed by LUIS GREGORIO at SURGERY [facility] ORS   • LUMBAR LAMINECTOMY DISKECTOMY  4/14/2011    Performed by JG,  "LUIS PAUL at SURGERY [facility] ORS   • RECOVERY  1/6/2011    Performed by SURGERY, CATH-RECOVERY at SURGERY SAME DAY [facility] ORS   • RECOVERY  12/9/2010    Performed by SURGERY, CATH-RECOVERY at SURGERY SAME DAY [facility] ORS   • OTHER CARDIAC SURGERY  2000    ablation for Atrial Flutter   • LUMBAR FUSION POSTERIOR  1996    L2-4 FUSION   • INGUINAL HERNIA REPAIR Left 1965   • APPENDECTOMY  1952   • TONSILLECTOMY  1947   • CATARACT EXTRACTION WITH IOL      OU     SOCHX:  reports that he has never smoked. He has never used smokeless tobacco. He reports that he does not drink alcohol or use drugs.  FH: Family history was reviewed, not contributing to acute injury     Objective:   /76 (BP Location: Right arm, Patient Position: Sitting, BP Cuff Size: Large adult)   Pulse 82   Temp 36.3 °C (97.3 °F) (Temporal)   Resp 16   Ht 1.778 m (5' 10\")   Wt 91.6 kg (202 lb)   SpO2 93%   BMI 28.98 kg/m²     Physical Exam  Constitutional:       General: He is not in acute distress.     Appearance: He is well-developed. He is not diaphoretic.   HENT:      Head: Normocephalic and atraumatic.   Eyes:      Extraocular Movements: Extraocular movements intact.      Conjunctiva/sclera: Conjunctivae normal.   Neck:      Musculoskeletal: Normal range of motion.      Trachea: No tracheal deviation.   Cardiovascular:      Rate and Rhythm: Normal rate and regular rhythm.      Heart sounds: Normal heart sounds.   Pulmonary:      Effort: Pulmonary effort is normal. No respiratory distress.      Breath sounds: Normal breath sounds. No wheezing or rales.   Musculoskeletal: Normal range of motion.      Comments: +TTP along lateral lower ribs   Skin:     General: Skin is warm and dry.      Findings: No rash.   Neurological:      Mental Status: He is alert.   Psychiatric:         Behavior: Behavior normal.         Thought Content: Thought content normal.         Judgment: Judgment normal.       Assessment/Plan:   Assessment    1. Rib " injury  - [id number] (WITH 1-VIEW CXR) RIGHT; Future    Patient with right-sided rib contusion and strain.  X-ray does not show pneumothorax or rib fracture.  Reviewed supportive care measures including heat, over-the-counter medication use, and importance of light activity/deep breaths to prevent pneumonia.  Patient will follow-up as needed.

## 2021-01-13 DIAGNOSIS — Z23 NEED FOR VACCINATION: ICD-10-CM

## 2021-01-19 ENCOUNTER — OFFICE VISIT (OUTPATIENT)
Dept: SLEEP MEDICINE | Facility: MEDICAL CENTER | Age: 86
End: 2021-01-19
Payer: MEDICARE

## 2021-01-19 VITALS
OXYGEN SATURATION: 96 % | BODY MASS INDEX: 30.06 KG/M2 | SYSTOLIC BLOOD PRESSURE: 134 MMHG | HEIGHT: 70 IN | DIASTOLIC BLOOD PRESSURE: 80 MMHG | WEIGHT: 210 LBS | HEART RATE: 77 BPM

## 2021-01-19 DIAGNOSIS — G47.33 OBSTRUCTIVE SLEEP APNEA: Chronic | ICD-10-CM

## 2021-01-19 DIAGNOSIS — I48.91 ATRIAL FIBRILLATION, UNSPECIFIED TYPE (HCC): Chronic | ICD-10-CM

## 2021-01-19 DIAGNOSIS — Z79.01 ANTICOAGULATED: ICD-10-CM

## 2021-01-19 PROCEDURE — 99213 OFFICE O/P EST LOW 20 MIN: CPT | Performed by: NURSE PRACTITIONER

## 2021-01-19 NOTE — PROGRESS NOTES
Chief Complaint   Patient presents with   • Follow-Up     TERESA-Last seen 03/17/2020       HPI:      Mr. Garsia is a 89 y/o male patient who is in today for TERESA f/u. PMH includes A. fib on Xarelto, aortic valve sclerosis, hypothyroid, hypoxemia, tonsillectomy.    Initial PSG indicated severe sleep apnea with an AHI 51/h with significant oxygen desaturations.    PSG titration from 4/26/16 indicated this began at 13 cm and then was raised to 15 cm. Bilevel was initiated as well up to 18/14 cm. The total sleep time was 6.7 hours. Limb movements were significant, but arousal index was low at 1.6. With CPAP and then bilevel, the patient had distinct improvement with regard to reduction of apnea hypopnea index, REM sleep was achieved at bilevel 18/13, index was 3.5 and saturations improved oxygen at 2 L was utilized and it may be necessary to add oxygen to maintain saturation, followed to ensure patient tolerance clinical improvement and oximetry monitoring is needed    OPO on autoBiPAP 19/11 cmH2O and 4L O2 bleed in from 12/10/19 indicated O2 Sat. jamie was 81% and mean O2 sat was 87 % and baseline O2 at 91%. O2 sat was below 88% for 57 min of the flow evaluation time. Oxygen Desaturation (>=3%) Index was elevated at 20/hr.  Consider increasing base EPAP pressure and O2 bleed in rate to 5L/min.    OPO on autoBiPAP 19/11 cmH2O and 5L O2 bleed in from 1/7/20 indicated O2 Sat. jamie was 88% and mean O2 sat was 91 % and baseline O2 at 94%. O2 sat was below 88% for 0 min of the flow evaluation time. Oxygen Desaturation (>=3%) Index was 4/hr.     Compliance report from 12/20/20-1/18/21 was downloaded and reviewed with the patient which showed autoBiPAP IPAP/EPAP 19.5/11 cmH2O with 5L O2 bleed in, PS max/min 4/4 cmH2O, 100% compliance, 7 hrs 17 min use, AHI of 5.3. He is tolerating the pressure and mask well. He goes to bed between 11-11:30 pm and wakes up at 6 am. He denies morning headache or snoring. He sleeps well through the  "night. He continues follow-up with Dr. Unger cardiology for Afib and is on Xarelto.  He stays active by riding a stationary bike every day for 30 minutes and performing stretching exercises.  He is interested in obtaining the COVID-19 vaccine.  He denies any new health problems or medications.      ROS:    Constitutional: Denies fevers, Denies weight changes  Eyes: Denies changes in vision, no eye pain  Ears/Nose/Throat/Mouth: Denies nasal congestion or sore throat   Cardiovascular: Denies chest pain or palpitations   Respiratory: Denies shortness of breath , Denies cough  Gastrointestinal/Hepatic: Denies abdominal pain, nausea, vomiting,   Skin/Breast: Denies rash,   Neurological: Denies headache, confusion,   Psychiatric: denies mood disorder   Sleep: denies snoring       Past Medical History:   Diagnosis Date   • A-fib ([facility]) 3-23-17   • Arrhythmia     ATRIAL FLUTTER; cardiologist, Dr. Unger ([facility])   • Arthritis 3-23-17    \"Everywhere\"   • Atrial flutter ([facility]) 1/4/2012    S/p ablation    • Cancer (HCC) 2007    PROSTATE TREATED WITH RADIATION    • Cancer (HCC) 2015    SKIN TREATED WITH MOHS   • Cataract     IOL OU   • Dry cough 10/26/2019    resolved   • Heart burn 3-23-17    Takes PEPCID   • Heart murmur     \"slight\"   • High cholesterol 3-23-17    Controlled with medication   • Lipids abnormal 1/4/2012   • Obstructive sleep apnea 3-23-17    CPAP USE &  Oxygen concentrator 4L nightly   • Pain     right shoulder   • Pain 11/11/15    left knee right hip   • Pain 3-23-17    \"Left Hip & Right Shoulder\"   • Post-nasal drip 3-23-17   • Rotator cuff arthropathy    • Unspecified disorder of thyroid     LOW THYROID   • Urinary bladder disorder 3-23-17    Takes [place]; bladder urgency   • Urinary incontinence 3-23-17       Past Surgical History:   Procedure Laterality Date   • PB TOTAL KNEE ARTHROPLASTY Right 11/8/2019    Procedure: ARTHROPLASTY, KNEE, TOTAL;  Surgeon: Clovis Cabral M.D.;  Location: SURGERY [facility];  Service: Orthopedics   • HIP ARTH ANTERIOR TOTAL Left 4/6/2017    Procedure: HIP ARTHROPLASTY ANTERIOR TOTAL;  Surgeon: Froylan Gunter M.D.;  Location: SURGERY [facility];  Service:    • KNEE ARTHROPLASTY TOTAL Left 11/24/2015    Procedure: KNEE ARTHROPLASTY TOTAL;  Surgeon: Clovis Cabral M.D.;  Location: SURGERY [facility];  Service:    • SHOULDER ARTHROSCOPY W/ ROTATOR CUFF REPAIR  5/29/2013    Performed by Clovis Cabral M.D. at [facility]   • SHOULDER DECOMPRESSION ARTHROSCOPIC  5/29/2013    Performed by Clovis Cabral M.D. at [facility]   • LUMBAR DECOMPRESSION  4/14/2011    Performed by LUIS GREGORIO at SURGERY [facility]   • LUMBAR LAMINECTOMY DISKECTOMY  4/14/2011    Performed by LUIS GREGORIO at SURGERY [facility]   • RECOVERY  1/6/2011    Performed by SURGERY, CATH-RECOVERY at SURGERY SAME DAY [facility] ORS   • RECOVERY  12/9/2010    Performed by SURGERY, CATH-RECOVERY at SURGERY SAME DAY [facility] ORS   • OTHER CARDIAC SURGERY  2000    ablation for Atrial Flutter   • LUMBAR FUSION POSTERIOR  1996    L2-4 FUSION   • INGUINAL HERNIA REPAIR Left 1965   • APPENDECTOMY  1952   • TONSILLECTOMY  1947   • CATARACT EXTRACTION WITH IOL      OU       Family History   Problem Relation Age of Onset   • Heart Attack Father 79   • Other Mother         Multiple myeloma   • Diabetes Other    • Cancer Other    • Heart Disease Other        Social History     Socioeconomic History   • Marital status:      Spouse name: Not on file   • Number of children: Not on file   • Years of education: Not on file   • Highest education level: Not on file   Occupational History   • Not on file   Social Needs   • Financial resource strain: Not on file   • Food insecurity     Worry: Not on file     Inability: Not on file   • Transportation needs     Medical: Not on file     Non-medical: Not on file   Tobacco Use   • Smoking status: Never Smoker   • Smokeless  tobacco: Never Used   Substance and Sexual Activity   • Alcohol use: No     Alcohol/week: 0.0 oz   • Drug use: No   • Sexual activity: Not on file   Lifestyle   • Physical activity     Days per week: Not on file     Minutes per session: Not on file   • Stress: Not on file   Relationships   • Social connections     Talks on phone: Not on file     Gets together: Not on file     Attends Spiritism service: Not on file     Active member of club or organization: Not on file     Attends meetings of clubs or organizations: Not on file     Relationship status: Not on file   • Intimate partner violence     Fear of current or ex partner: Not on file     Emotionally abused: Not on file     Physically abused: Not on file     Forced sexual activity: Not on file   Other Topics Concern   • Not on file   Social History Narrative   • Not on file       Allergies as of 01/19/2021   • (No Known Allergies)        Vitals:  Vitals:    01/19/21 1341   BP: 134/80   Pulse: 77   SpO2: 96%       Current medications as of today   Current Outpatient Medications   Medication Sig Dispense Refill   • liothyronine (CYTOMEL) 5 MCG Tab Take 5 mcg by mouth every day.     • loratadine (CLARITIN) 10 MG Tab Take 10 mg by mouth every day.     • cetirizine (ZYRTEC) 10 MG Tab Take 10 mg by mouth every day.     • metoprolol SR (TOPROL XL) 100 MG TABLET SR 24 HR Take 100 mg by mouth every evening.     • acetaminophen (TYLENOL) 500 MG Tab Take 500-1,000 mg by mouth Pre-Op Once.     • levothyroxine (SYNTHROID) 100 MCG Tab Take 100 mcg by mouth Every morning on an empty stomach.     • testosterone cypionate (DEPO-TESTOSTERONE) 200 MG/ML Solution injection 100 mg by Intramuscular route every 14 days.     • rivaroxaban (XARELTO) 15 MG Tab tablet Take 15 mg by mouth every bedtime. Atrial Fib     • Ipratropium Bromide (ATROVENT NA) Spray 2 Sprays in nose as needed.     • famotidine (PEPCID) 40 MG Tab Take 40 mg by mouth as needed.     • tamsulosin (FLOMAX) 0.4 MG  capsule Take 0.4 mg by mouth every evening.     • Cholecalciferol (VITAMIN D3) 5000 UNITS Tab Take 1 Tab by mouth every day.     • Calcium Carbonate (CALCIUM 500 PO) Take 2 Tabs by mouth every day.     • trospium (SANCTURA) 20 MG TABS Take 20 mg by mouth 2 Times a Day.     • Multiple Vitamin (MULTI-VITAMIN PO) Take 1 Tab by mouth every evening.     • atorvastatin (LIPITOR) 40 MG TABS Take 40 mg by mouth every evening.       No current facility-administered medications for this visit.          Physical Exam: Limited by COVID-19 precautions.  Appearance: Well developed, well nourished, no acute distress  Eyes: PERRL, EOM intact, sclera white, conjunctiva moist  Ears: no lesions or deformities  Hearing: grossly intact  Nose: no lesions or deformities  Respiratory effort: no intercostal retractions or use of accessory muscles  Extremities: no cyanosis or edema  Abdomen: soft  Gait and Station: normal  Digits and nails: no clubbing, cyanosis, petechiae or nodes.  Cranial nerves: grossly intact  Skin: no visible rashes, lesions or ulcers noted  Orientation: Oriented to time, person and place  Mood and affect: mood and affect appropriate, normal interaction with examiner  Judgement: Intact    Assessment:  1. Obstructive sleep apnea  DME Mask and Supplies   2. Atrial fibrillation, unspecified type (HCC)     3. Anticoagulated     4. BMI 30.0-30.9,adult           Plan  Discussed the cardiovascular and neuropsychiatric risks of untreated TERESA; including but not limited to: HTN, DM, MI, ASCVD, CVA, CHF, traffic accidents.     1. Compliance report from 12/20/20-1/18/21 was downloaded and reviewed with the patient which showed autoBiPAP IPAP/EPAP 19.5/11 cmH2O with 5L O2 bleed in, PS max/min 4/4 cmH2O, 100% compliance, 7 hrs 17 min use, AHI of 5.3. Continue autoBiPAP and 5L O2. Patient is compliant and benefiting from autoBiPAP and 5L O2 bleed in therapy for management of TERESA.   2. DME order ([employer]) for mask (nasal mask or MOC)  and supplies was provided today. Continue to clean mask and supplies weekly with soap and water, and change supplies per insurance guidelines.   3. Continue to stay active by walking and riding a stationary bike.     4. Follow up with the appropriate healthcare practitioners for all other medical problems and issues.  5. Sleep hygiene discussed.    6. Continue to f/u with cardiology, Dr. Unger.   7.  Advised patient to keep checking MyChart for a ticket to schedule an apt or go through PCP.   8. F/u in 1 year, sooner if needed.       ÁNGEL Denton.      This dictation was created using voice recognition software. The accuracy of the dictation is limited to the abilities of the software. I expect there may be some errors of grammar and possibly content.

## 2021-02-18 NOTE — TELEPHONE ENCOUNTER
Pt called and left vm stating that his current o2 concentrator was no longer being serviced and that he was interested in possibly getting a new machine through insurance. Spoke to Carina and she advised pt could get new concentrator if he completed a titration study otherwise he could try and get his current machine serviced through [employer]. Informed that the [employer] option did come at a charge but I was not sure how much. Advised that he call us back to let us know what he would like to do.

## 2021-02-19 NOTE — TELEPHONE ENCOUNTER
Called pt again and he advised he would try and go the [employer] route first and see how that goes. Informed he would call us back with any other questions or concerns.   Hepatology

## 2021-10-14 ENCOUNTER — HOSPITAL ENCOUNTER (OUTPATIENT)
Dept: CARDIOLOGY | Facility: MEDICAL CENTER | Age: 86
End: 2021-10-14
Attending: INTERNAL MEDICINE
Payer: MEDICARE

## 2021-10-14 DIAGNOSIS — I34.0 MITRAL VALVE INSUFFICIENCY, UNSPECIFIED ETIOLOGY: ICD-10-CM

## 2021-10-14 LAB
LV EJECT FRACT MOD 2C 99903: 47.76
LV EJECT FRACT MOD 4C 99902: 59.28
LV EJECT FRACT MOD BP 99901: 55.02

## 2021-10-14 PROCEDURE — 93306 TTE W/DOPPLER COMPLETE: CPT

## 2022-11-10 ENCOUNTER — PATIENT MESSAGE (OUTPATIENT)
Dept: HEALTH INFORMATION MANAGEMENT | Facility: OTHER | Age: 87
End: 2022-11-10

## 2024-09-09 NOTE — TELEPHONE ENCOUNTER
Doubled checked Rx on respironics, it was at 19.5/11.    Changed 19/11 but he indicates it still says 15.    I advised him to give it an hour to see if it changes or not, if not to bring in BIPAP for adjustment.  
Pt called stating his BIPAP is showing a pressure of 15?? I advised him it should be 19/11 but I'm confused because the note says it was adjusted but the pressure recommended are the same as what he was on, what pressure should this pt be on??  
Recommendation was to adjust settings to auto BiPAP EPAP 11 cm, IPAP 19 cm, PS 4 with 5 L oxygen bleed in when he saw dr. Ibarra, but on review of his compliance at last OV, this did not happen.  These should be his settings now; Deepa adjusted it.  
What Type Of Note Output Would You Prefer (Optional)?: Standard Output
Hpi Title: Evaluation of Skin Lesions

## 2025-03-26 ENCOUNTER — HOSPITAL ENCOUNTER (OUTPATIENT)
Dept: RADIOLOGY | Facility: MEDICAL CENTER | Age: OVER 89
End: 2025-03-26
Attending: OTOLARYNGOLOGY
Payer: MEDICARE

## 2025-03-26 DIAGNOSIS — R05.9 COUGH, UNSPECIFIED TYPE: ICD-10-CM

## 2025-03-26 PROCEDURE — 71046 X-RAY EXAM CHEST 2 VIEWS: CPT

## (undated) DEVICE — SOLUTION 10%PVP-IODINE 8OZ - (24/CA)

## (undated) DEVICE — DRAPE SRG LG BCK TBL DISP - 10/CA

## (undated) DEVICE — KIT ANESTHESIA W/CIRCUIT & 3/LT BAG W/FILTER (20EA/CA)

## (undated) DEVICE — GLOVE BIOGEL SZ 7 SURGICAL PF LTX - (50PR/BX 4BX/CA)

## (undated) DEVICE — GLOVE BIOGEL INDICATOR SZ 7.5 SURGICAL PF LTX - (50PR/BX 4BX/CA)

## (undated) DEVICE — HANDPIECE 10FT INTPLS SCT PLS IRRIGATION HAND CONTROL SET (6/PK)

## (undated) DEVICE — TIP INTPLS HFLO ML ORFC BTRY - (12/CS)  FOR SURGILAV

## (undated) DEVICE — KIT ROOM DECONTAMINATION

## (undated) DEVICE — GLOVE BIOGEL INDICATOR SZ 8 SURGICAL PF LTX - (50/BX 4BX/CA)

## (undated) DEVICE — SODIUM CHL IRRIGATION 0.9% 1000ML (12EA/CA)

## (undated) DEVICE — SUTURE 1 VICRYL PLUS CTX - 8 X 18 INCH (12/BX)

## (undated) DEVICE — CHLORAPREP 26 ML APPLICATOR - ORANGE TINT(25/CA)

## (undated) DEVICE — WATER IRRIGATION STERILE 1000ML (12EA/CA)

## (undated) DEVICE — SUTURE GENERAL

## (undated) DEVICE — DRAPE C-ARM LARGE 41IN X 74 IN - (10/BX 2BX/CA)

## (undated) DEVICE — BLADE 90X12.5X1.37MM SAW SAGITTAL

## (undated) DEVICE — DERMABOND ADVANCED - (12EA/BX)

## (undated) DEVICE — STOCKINETTE IMPERVIOUS 12X48 - STERILELF (10/CA)"

## (undated) DEVICE — SENSOR SPO2 NEO LNCS ADHESIVE (20/BX) SEE USER NOTES

## (undated) DEVICE — MASK ANESTHESIA ADULT  - (100/CA)

## (undated) DEVICE — SUCTION INSTRUMENT YANKAUER BULBOUS TIP W/O VENT (50EA/CA)

## (undated) DEVICE — ELECTRODE DUAL RETURN W/ CORD - (50/PK)

## (undated) DEVICE — PACK TOTAL KNEE  (1/CA)

## (undated) DEVICE — HUMID-VENT HEAT AND MOISTURE EXCHANGE- (50/BX)

## (undated) DEVICE — GLOVE BIOGEL SZ 8 SURGICAL PF LTX - (50PR/BX 4BX/CA)

## (undated) DEVICE — BLADE SAGITTAL SYSTEM 18MM

## (undated) DEVICE — DRESSING TRANSPARENT FILM TEGADERM 4 X 4.75" (50EA/BX)"

## (undated) DEVICE — DRESSING AQUACEL AG ADVANTAGE 3.5 X 10" (10EA/BX)"

## (undated) DEVICE — SUTURE 2-0 MONOCRYL PLUS UNDYED CT-1 1 X 36 (36EA/BX)"

## (undated) DEVICE — CANISTER SUCTION RIGID RED 1500CC (40EA/CA)

## (undated) DEVICE — DEVICE 45MM FIXATION SPEED PIN

## (undated) DEVICE — BLADE SAGITTAL 6 SYSTEM 25MM

## (undated) DEVICE — GVL 3 STAT DISPOSABLE - (10/BX)

## (undated) DEVICE — SUTURE 2-0 MONOCRYL SH&UR-6 27 - (12/BX)

## (undated) DEVICE — SET EXTENSION WITH 2 PORTS (48EA/CA) ***PART #2C8610 IS A SUBSTITUTE*****

## (undated) DEVICE — PROTECTOR ULNA NERVE - (36PR/CA)

## (undated) DEVICE — LENS/HOOD FOR SPACESUIT - (32/PK) PEEL AWAY FACE

## (undated) DEVICE — BLADE SAGITTAL DUAL 25MM

## (undated) DEVICE — NEPTUNE 4 PORT MANIFOLD - (20/PK)

## (undated) DEVICE — PACK TOTAL HIP - (1/CA)

## (undated) DEVICE — TOWELS CLOTH SURGICAL - (4/PK 20PK/CA)

## (undated) DEVICE — GOWN WARMING STANDARD FLEX - (30/CA)

## (undated) DEVICE — GLOVE BIOGEL SZ 6.5 SURGICAL PF LTX (50PR/BX 4BX/CA)

## (undated) DEVICE — ELECTRODE 850 FOAM ADHESIVE - HYDROGEL RADIOTRNSPRNT (50/PK)

## (undated) DEVICE — BAG, SPONGE COUNT 50600

## (undated) DEVICE — BLOCK

## (undated) DEVICE — HEAD HOLDER JUNIOR/ADULT

## (undated) DEVICE — SYRINGE SAFETY 3 ML 18 GA X 1 1/2 BLUNT LL (100/BX 8BX/CA)

## (undated) DEVICE — GLOVE BIOGEL INDICATOR SZ 6.5 SURGICAL PF LTX - (50PR/BX 4BX/CA)

## (undated) DEVICE — LACTATED RINGERS INJ 1000 ML - (14EA/CA 60CA/PF)

## (undated) DEVICE — DRAPE SURGICAL U 77X120 - (10/CA)

## (undated) DEVICE — Device

## (undated) DEVICE — SET LEADWIRE 5 LEAD BEDSIDE DISPOSABLE ECG (1SET OF 5/EA)

## (undated) DEVICE — GLOVE, LITE (PAIR)

## (undated) DEVICE — BLADE RECIPROCATING 12.7 X 78.7 X 1.0MM (1/EA)

## (undated) DEVICE — PIN TROCAR GEN 1/8X3 (4EA/BX)

## (undated) DEVICE — TUBING CLEARLINK DUO-VENT - C-FLO (48EA/CA)

## (undated) DEVICE — SODIUM CHL. IRRIGATION 0.9% 3000ML (4EA/CA 65CA/PF)

## (undated) DEVICE — GLOVE SZ 7.5 LF PROTEXIS (50PR/BX)

## (undated) DEVICE — CANISTER SUCTION 3000ML MECHANICAL FILTER AUTO SHUTOFF MEDI-VAC NONSTERILE LF DISP  (40EA/CA)

## (undated) DEVICE — SYS BN CMNT HI VAC KT MXR BWL - (MIX-E-VAC II)  (10EA/CA)

## (undated) DEVICE — SUTURE 0 SILK TIES (36PK/BX)

## (undated) DEVICE — BANDAGE ELASTIC STERILE VELCRO 6 X 5 YDS (25EA/CA)

## (undated) DEVICE — TUBE CONNECTING SUCTION - CLEAR PLASTIC STERILE 72 IN (50EA/CA)

## (undated) DEVICE — PEN SKIN MARKER W/RULER - (50EA/BX)

## (undated) DEVICE — KIT DISPOSABLE HIP 2.8MM IMPLANT INCLUDES DRILL DRILL GUIDE AND OBTURATOR

## (undated) DEVICE — TUBE E-T HI-LO CUFF 7.5MM (10EA/PK)

## (undated) DEVICE — LEAD SET 6 DISP. EKG NIHON KOHDEN

## (undated) DEVICE — SUTURE 3-0 MONOCRYL PLUS PS-1 - 27 INCH (36/BX)